# Patient Record
Sex: FEMALE | HISPANIC OR LATINO
[De-identification: names, ages, dates, MRNs, and addresses within clinical notes are randomized per-mention and may not be internally consistent; named-entity substitution may affect disease eponyms.]

---

## 2019-01-04 ENCOUNTER — APPOINTMENT (OUTPATIENT)
Dept: ORTHOPEDIC SURGERY | Facility: CLINIC | Age: 49
End: 2019-01-04
Payer: MEDICAID

## 2019-01-04 VITALS — RESPIRATION RATE: 16 BRPM | BODY MASS INDEX: 21.69 KG/M2 | WEIGHT: 135 LBS | HEIGHT: 66 IN

## 2019-01-04 DIAGNOSIS — Z82.61 FAMILY HISTORY OF ARTHRITIS: ICD-10-CM

## 2019-01-04 DIAGNOSIS — Z78.9 OTHER SPECIFIED HEALTH STATUS: ICD-10-CM

## 2019-01-04 PROCEDURE — 77071 MNL APPL STRS JT RADIOGRAPHY: CPT

## 2019-01-04 PROCEDURE — 99204 OFFICE O/P NEW MOD 45 MIN: CPT

## 2019-01-05 ENCOUNTER — TRANSCRIPTION ENCOUNTER (OUTPATIENT)
Age: 49
End: 2019-01-05

## 2019-01-08 ENCOUNTER — FORM ENCOUNTER (OUTPATIENT)
Age: 49
End: 2019-01-08

## 2019-01-09 ENCOUNTER — APPOINTMENT (OUTPATIENT)
Dept: RADIOLOGY | Facility: CLINIC | Age: 49
End: 2019-01-09

## 2019-01-09 ENCOUNTER — OUTPATIENT (OUTPATIENT)
Dept: OUTPATIENT SERVICES | Facility: HOSPITAL | Age: 49
LOS: 1 days | End: 2019-01-09
Payer: COMMERCIAL

## 2019-01-09 PROCEDURE — 73610 X-RAY EXAM OF ANKLE: CPT | Mod: 26,50

## 2019-01-09 PROCEDURE — 73610 X-RAY EXAM OF ANKLE: CPT

## 2019-01-15 ENCOUNTER — APPOINTMENT (OUTPATIENT)
Age: 49
End: 2019-01-15
Payer: MEDICAID

## 2019-01-15 PROCEDURE — 27792 TREATMENT OF ANKLE FRACTURE: CPT | Mod: LT

## 2019-01-15 PROCEDURE — 29898 ANKLE ARTHROSCOPY/SURGERY: CPT | Mod: LT

## 2019-01-15 PROCEDURE — 76000 FLUOROSCOPY <1 HR PHYS/QHP: CPT | Mod: 26

## 2019-01-15 PROCEDURE — 29891 ARTHR ANK OSTCHN DF TAL&/TIB: CPT | Mod: LT

## 2019-01-15 PROCEDURE — 27829 TREAT LOWER LEG JOINT: CPT | Mod: LT

## 2019-01-15 PROCEDURE — 77071 MNL APPL STRS JT RADIOGRAPHY: CPT | Mod: LT

## 2019-01-27 ENCOUNTER — FORM ENCOUNTER (OUTPATIENT)
Age: 49
End: 2019-01-27

## 2019-01-28 ENCOUNTER — APPOINTMENT (OUTPATIENT)
Dept: RADIOLOGY | Facility: CLINIC | Age: 49
End: 2019-01-28

## 2019-01-28 ENCOUNTER — APPOINTMENT (OUTPATIENT)
Dept: ORTHOPEDIC SURGERY | Facility: CLINIC | Age: 49
End: 2019-01-28
Payer: MEDICAID

## 2019-01-28 ENCOUNTER — OUTPATIENT (OUTPATIENT)
Dept: OUTPATIENT SERVICES | Facility: HOSPITAL | Age: 49
LOS: 1 days | End: 2019-01-28
Payer: MEDICAID

## 2019-01-28 PROCEDURE — 99024 POSTOP FOLLOW-UP VISIT: CPT

## 2019-01-28 PROCEDURE — 73610 X-RAY EXAM OF ANKLE: CPT

## 2019-01-28 PROCEDURE — 73610 X-RAY EXAM OF ANKLE: CPT | Mod: 26,LT

## 2019-02-24 ENCOUNTER — FORM ENCOUNTER (OUTPATIENT)
Age: 49
End: 2019-02-24

## 2019-02-25 ENCOUNTER — APPOINTMENT (OUTPATIENT)
Dept: RADIOLOGY | Facility: CLINIC | Age: 49
End: 2019-02-25

## 2019-02-25 ENCOUNTER — APPOINTMENT (OUTPATIENT)
Dept: ORTHOPEDIC SURGERY | Facility: CLINIC | Age: 49
End: 2019-02-25
Payer: MEDICAID

## 2019-02-25 ENCOUNTER — OUTPATIENT (OUTPATIENT)
Dept: OUTPATIENT SERVICES | Facility: HOSPITAL | Age: 49
LOS: 1 days | End: 2019-02-25
Payer: COMMERCIAL

## 2019-02-25 VITALS — WEIGHT: 135 LBS | BODY MASS INDEX: 21.69 KG/M2 | HEIGHT: 66 IN | RESPIRATION RATE: 16 BRPM

## 2019-02-25 PROCEDURE — 99024 POSTOP FOLLOW-UP VISIT: CPT

## 2019-02-25 PROCEDURE — 73610 X-RAY EXAM OF ANKLE: CPT | Mod: 26,LT

## 2019-02-25 PROCEDURE — 73610 X-RAY EXAM OF ANKLE: CPT

## 2019-02-25 RX ORDER — OXYCODONE AND ACETAMINOPHEN 5; 325 MG/1; MG/1
5-325 TABLET ORAL
Qty: 60 | Refills: 0 | Status: DISCONTINUED | COMMUNITY
Start: 2019-01-14 | End: 2019-02-25

## 2019-02-25 RX ORDER — ASPIRIN 325 MG/1
325 TABLET, FILM COATED ORAL TWICE DAILY
Qty: 60 | Refills: 0 | Status: DISCONTINUED | COMMUNITY
Start: 2019-01-14 | End: 2019-02-25

## 2019-02-25 RX ORDER — ONDANSETRON 4 MG/1
4 TABLET ORAL
Qty: 10 | Refills: 0 | Status: DISCONTINUED | COMMUNITY
Start: 2019-01-14 | End: 2019-02-25

## 2019-02-25 RX ORDER — DOCUSATE SODIUM 100 MG/1
100 CAPSULE ORAL TWICE DAILY
Qty: 28 | Refills: 0 | Status: DISCONTINUED | COMMUNITY
Start: 2019-01-14 | End: 2019-02-25

## 2019-04-10 ENCOUNTER — EMERGENCY (EMERGENCY)
Facility: HOSPITAL | Age: 49
LOS: 1 days | Discharge: HOME | End: 2019-04-10
Attending: EMERGENCY MEDICINE | Admitting: EMERGENCY MEDICINE
Payer: MEDICAID

## 2019-04-10 VITALS
HEART RATE: 81 BPM | RESPIRATION RATE: 17 BRPM | SYSTOLIC BLOOD PRESSURE: 121 MMHG | DIASTOLIC BLOOD PRESSURE: 76 MMHG | WEIGHT: 139.99 LBS | TEMPERATURE: 98 F | OXYGEN SATURATION: 96 %

## 2019-04-10 DIAGNOSIS — S82.892A OTHER FRACTURE OF LEFT LOWER LEG, INITIAL ENCOUNTER FOR CLOSED FRACTURE: Chronic | ICD-10-CM

## 2019-04-10 PROCEDURE — 73610 X-RAY EXAM OF ANKLE: CPT | Mod: 26,LT

## 2019-04-10 PROCEDURE — 99283 EMERGENCY DEPT VISIT LOW MDM: CPT | Mod: 25

## 2019-04-10 NOTE — ED ADULT NURSE NOTE - NSIMPLEMENTINTERV_GEN_ALL_ED
Implemented All Universal Safety Interventions:  [place] to call system. Call bell, personal items and telephone within reach. Instruct patient to call for assistance. Room bathroom lighting operational. Non-slip footwear when patient is off stretcher. Physically safe environment: no spills, clutter or unnecessary equipment. Stretcher in lowest position, wheels locked, appropriate side rails in place.

## 2019-04-10 NOTE — ED PROVIDER NOTE - DIAGNOSTIC INTERPRETATION
Interpreted by ED Physician:  Ankle xray (3 view)- no fx, no jt effusion, no soft tissue swelling, hardware in place

## 2019-04-10 NOTE — ED PROVIDER NOTE - NSFOLLOWUPINSTRUCTIONS_ED_ALL_ED_FT
Apply bacitracin to the wound 2-3 times daily until scab is well formed.  Follow up with Dr. Gaspar as needed.  Return to the ER for increased pain, signs of infection.

## 2019-04-10 NOTE — ED PROVIDER NOTE - OBJECTIVE STATEMENT
48 y.o F with PSHx for left ankle Fx presents to ED with injury above left ankle. She sustained her injury after being struck by a rolling platform full of folding chairs at work. She recently had ankle surgery for bimalar Fx by Dr Alfonso. She healed well, but the accident from earlier today prompted her to arrive to ED for evaluation. She sustained the injury above the L ankle along the medial side and has an abrasion as a result. Pt is up to date with tetanus, and did not take any medications PTA. She reports that there is no pain on surgical site. No numbness, no tingling, and no weakness 48 y.o F with PSHx for left ankle Fx presents to ED with injury above left ankle. She sustained her injury after being struck by a rolling platform full of folding chairs at work. She recently had ankle surgery for brittany Fx by Dr Alfonso. She healed well, but the accident from earlier today prompted her to arrive to ED for evaluation. She sustained the injury above the L ankle along the medial side and has an abrasion as a result. Pt is up to date with tetanus, and did not take any medications PTA. She reports that there is no pain on surgical site. No numbness, no tingling, and no weakness

## 2019-04-10 NOTE — ED PROVIDER NOTE - CLINICAL SUMMARY MEDICAL DECISION MAKING FREE TEXT BOX
48 y.o F with past SHx for left ankle fx presents to ED with injury sustained above L ankle at work. Plan is to apply Bacitracin to the wound and do an XR given recent surgery. Anticipate discharge.

## 2019-04-10 NOTE — ED PROVIDER NOTE - NSCAREINITIATED _GEN_ER
Informed caregiver Kim that patient due for depo and office visit. Nurse visit scheduled 4/26/18 for depo injection and office visit for yearly physical scheduled 6/8/18. Confirmed with Kim today.  
Kim 
She is overdue for her depo. She is also due for yearly OV with me in [place]  
Yuko Zarco(Attending)
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
## 2019-04-14 DIAGNOSIS — M25.572 PAIN IN LEFT ANKLE AND JOINTS OF LEFT FOOT: ICD-10-CM
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
## 2019-04-14 DIAGNOSIS — S80.12XA CONTUSION OF LEFT LOWER LEG, INITIAL ENCOUNTER: ICD-10-CM

## 2019-05-01 ENCOUNTER — APPOINTMENT (OUTPATIENT)
Dept: ORTHOPEDIC SURGERY | Facility: CLINIC | Age: 49
End: 2019-05-01

## 2019-12-19 ENCOUNTER — TRANSCRIPTION ENCOUNTER (OUTPATIENT)
Age: 49
End: 2019-12-19

## 2020-01-08 NOTE — ED ADULT NURSE NOTE - ED COMFORT CARE
1. Have you been to the ER, urgent care clinic since your last visit? NO  Hospitalized since your last visit? No    2. Have you seen or consulted any other health care providers outside of the [address] [place] [address] since your last visit? Include any pap smears or colon screening. No    Wt Readings from Last 3 Encounters:   01/08/20 218 lb (98.9 kg)   06/05/19 220 lb 1.6 oz (99.8 kg)   02/05/19 224 lb 4.8 oz (101.7 kg)     Temp Readings from Last 3 Encounters:   01/08/20 96.7 °F (35.9 °C) (Oral)   06/05/19 97.4 °F (36.3 °C) (Oral)   02/05/19 96.9 °F (36.1 °C) (Oral)     BP Readings from Last 3 Encounters:   01/08/20 134/74   06/05/19 163/80   02/05/19 162/87     Pulse Readings from Last 3 Encounters:   01/08/20 84   06/05/19 82   02/05/19 79     Lab Results   Component Value Date/Time    Hemoglobin A1c 7.6 (H) 12/06/2019 10:05 AM    Hemoglobin A1c (POC) 7.8 04/21/2014 01:34 PM     Patient has meter today. Patient informed/Explanation of wait

## 2020-01-19 ENCOUNTER — EMERGENCY (EMERGENCY)
Facility: HOSPITAL | Age: 50
LOS: 1 days | Discharge: HOME | End: 2020-01-19
Attending: EMERGENCY MEDICINE | Admitting: EMERGENCY MEDICINE
Payer: MEDICAID

## 2020-01-19 VITALS
HEIGHT: 66 IN | SYSTOLIC BLOOD PRESSURE: 137 MMHG | TEMPERATURE: 99 F | WEIGHT: 136.69 LBS | RESPIRATION RATE: 17 BRPM | HEART RATE: 110 BPM | DIASTOLIC BLOOD PRESSURE: 88 MMHG | OXYGEN SATURATION: 98 %

## 2020-01-19 VITALS
SYSTOLIC BLOOD PRESSURE: 125 MMHG | DIASTOLIC BLOOD PRESSURE: 80 MMHG | TEMPERATURE: 98 F | HEART RATE: 90 BPM | RESPIRATION RATE: 16 BRPM | OXYGEN SATURATION: 96 %

## 2020-01-19 DIAGNOSIS — S82.892A OTHER FRACTURE OF LEFT LOWER LEG, INITIAL ENCOUNTER FOR CLOSED FRACTURE: Chronic | ICD-10-CM

## 2020-01-19 LAB
ALBUMIN SERPL ELPH-MCNC: 3.9 G/DL — SIGNIFICANT CHANGE UP (ref 3.4–5)
ALP SERPL-CCNC: 90 U/L — SIGNIFICANT CHANGE UP (ref 40–120)
ALT FLD-CCNC: 22 U/L — SIGNIFICANT CHANGE UP (ref 12–42)
ANION GAP SERPL CALC-SCNC: 10 MMOL/L — SIGNIFICANT CHANGE UP (ref 9–16)
APPEARANCE UR: CLEAR — SIGNIFICANT CHANGE UP
APTT BLD: 35.6 SEC — SIGNIFICANT CHANGE UP (ref 27.5–36.3)
AST SERPL-CCNC: 14 U/L — LOW (ref 15–37)
BASOPHILS # BLD AUTO: 0.02 K/UL — SIGNIFICANT CHANGE UP (ref 0–0.2)
BASOPHILS NFR BLD AUTO: 0.3 % — SIGNIFICANT CHANGE UP (ref 0–2)
BILIRUB SERPL-MCNC: 0.1 MG/DL — LOW (ref 0.2–1.2)
BILIRUB UR-MCNC: NEGATIVE — SIGNIFICANT CHANGE UP
BUN SERPL-MCNC: 13 MG/DL — SIGNIFICANT CHANGE UP (ref 7–23)
CALCIUM SERPL-MCNC: 9 MG/DL — SIGNIFICANT CHANGE UP (ref 8.5–10.5)
CHLORIDE SERPL-SCNC: 102 MMOL/L — SIGNIFICANT CHANGE UP (ref 96–108)
CO2 SERPL-SCNC: 29 MMOL/L — SIGNIFICANT CHANGE UP (ref 22–31)
COLOR SPEC: SIGNIFICANT CHANGE UP
CREAT SERPL-MCNC: 0.75 MG/DL — SIGNIFICANT CHANGE UP (ref 0.5–1.3)
DIFF PNL FLD: ABNORMAL
EOSINOPHIL # BLD AUTO: 0.12 K/UL — SIGNIFICANT CHANGE UP (ref 0–0.5)
EOSINOPHIL NFR BLD AUTO: 1.8 % — SIGNIFICANT CHANGE UP (ref 0–6)
FLU A RESULT: DETECTED
FLU A RESULT: DETECTED
FLUAV AG NPH QL: DETECTED
FLUBV AG NPH QL: SIGNIFICANT CHANGE UP
GLUCOSE SERPL-MCNC: 104 MG/DL — HIGH (ref 70–99)
GLUCOSE UR QL: NEGATIVE — SIGNIFICANT CHANGE UP
HCG UR QL: NEGATIVE — SIGNIFICANT CHANGE UP
HCT VFR BLD CALC: 38.8 % — SIGNIFICANT CHANGE UP (ref 34.5–45)
HGB BLD-MCNC: 12.8 G/DL — SIGNIFICANT CHANGE UP (ref 11.5–15.5)
IMM GRANULOCYTES NFR BLD AUTO: 0.3 % — SIGNIFICANT CHANGE UP (ref 0–1.5)
INR BLD: 1.04 — SIGNIFICANT CHANGE UP (ref 0.88–1.16)
KETONES UR-MCNC: NEGATIVE — SIGNIFICANT CHANGE UP
LEUKOCYTE ESTERASE UR-ACNC: ABNORMAL
LYMPHOCYTES # BLD AUTO: 0.94 K/UL — LOW (ref 1–3.3)
LYMPHOCYTES # BLD AUTO: 14.2 % — SIGNIFICANT CHANGE UP (ref 13–44)
MCHC RBC-ENTMCNC: 29.2 PG — SIGNIFICANT CHANGE UP (ref 27–34)
MCHC RBC-ENTMCNC: 33 GM/DL — SIGNIFICANT CHANGE UP (ref 32–36)
MCV RBC AUTO: 88.6 FL — SIGNIFICANT CHANGE UP (ref 80–100)
MONOCYTES # BLD AUTO: 0.39 K/UL — SIGNIFICANT CHANGE UP (ref 0–0.9)
MONOCYTES NFR BLD AUTO: 5.9 % — SIGNIFICANT CHANGE UP (ref 2–14)
NEUTROPHILS # BLD AUTO: 5.14 K/UL — SIGNIFICANT CHANGE UP (ref 1.8–7.4)
NEUTROPHILS NFR BLD AUTO: 77.5 % — HIGH (ref 43–77)
NITRITE UR-MCNC: NEGATIVE — SIGNIFICANT CHANGE UP
NRBC # BLD: 0 /100 WBCS — SIGNIFICANT CHANGE UP (ref 0–0)
PH UR: 7 — SIGNIFICANT CHANGE UP (ref 5–8)
PLATELET # BLD AUTO: 289 K/UL — SIGNIFICANT CHANGE UP (ref 150–400)
POTASSIUM SERPL-MCNC: 3.8 MMOL/L — SIGNIFICANT CHANGE UP (ref 3.5–5.3)
POTASSIUM SERPL-SCNC: 3.8 MMOL/L — SIGNIFICANT CHANGE UP (ref 3.5–5.3)
PROT SERPL-MCNC: 7.6 G/DL — SIGNIFICANT CHANGE UP (ref 6.4–8.2)
PROT UR-MCNC: NEGATIVE MG/DL — SIGNIFICANT CHANGE UP
PROTHROM AB SERPL-ACNC: 11.5 SEC — SIGNIFICANT CHANGE UP (ref 10–12.9)
RBC # BLD: 4.38 M/UL — SIGNIFICANT CHANGE UP (ref 3.8–5.2)
RBC # FLD: 14.1 % — SIGNIFICANT CHANGE UP (ref 10.3–14.5)
RSV RESULT: SIGNIFICANT CHANGE UP
RSV RNA RESP QL NAA+PROBE: SIGNIFICANT CHANGE UP
SODIUM SERPL-SCNC: 141 MMOL/L — SIGNIFICANT CHANGE UP (ref 132–145)
SP GR SPEC: 1.01 — SIGNIFICANT CHANGE UP (ref 1–1.03)
UROBILINOGEN FLD QL: 0.2 E.U./DL — SIGNIFICANT CHANGE UP
WBC # BLD: 6.63 K/UL — SIGNIFICANT CHANGE UP (ref 3.8–10.5)
WBC # FLD AUTO: 6.63 K/UL — SIGNIFICANT CHANGE UP (ref 3.8–10.5)

## 2020-01-19 PROCEDURE — 71046 X-RAY EXAM CHEST 2 VIEWS: CPT | Mod: 26

## 2020-01-19 PROCEDURE — 93010 ELECTROCARDIOGRAM REPORT: CPT

## 2020-01-19 PROCEDURE — 99285 EMERGENCY DEPT VISIT HI MDM: CPT

## 2020-01-19 RX ORDER — ACETAMINOPHEN 500 MG
975 TABLET ORAL ONCE
Refills: 0 | Status: COMPLETED | OUTPATIENT
Start: 2020-01-19 | End: 2020-01-19

## 2020-01-19 RX ORDER — IBUPROFEN 200 MG
1 TABLET ORAL
Qty: 30 | Refills: 0
Start: 2020-01-19

## 2020-01-19 RX ORDER — CEFTRIAXONE 500 MG/1
1000 INJECTION, POWDER, FOR SOLUTION INTRAMUSCULAR; INTRAVENOUS ONCE
Refills: 0 | Status: COMPLETED | OUTPATIENT
Start: 2020-01-19 | End: 2020-01-19

## 2020-01-19 RX ORDER — ALBUTEROL 90 UG/1
2 AEROSOL, METERED ORAL
Qty: 1 | Refills: 0
Start: 2020-01-19 | End: 2020-02-17

## 2020-01-19 RX ORDER — SODIUM CHLORIDE 9 MG/ML
1900 INJECTION INTRAMUSCULAR; INTRAVENOUS; SUBCUTANEOUS ONCE
Refills: 0 | Status: COMPLETED | OUTPATIENT
Start: 2020-01-19 | End: 2020-01-19

## 2020-01-19 RX ORDER — IPRATROPIUM/ALBUTEROL SULFATE 18-103MCG
3 AEROSOL WITH ADAPTER (GRAM) INHALATION ONCE
Refills: 0 | Status: COMPLETED | OUTPATIENT
Start: 2020-01-19 | End: 2020-01-19

## 2020-01-19 RX ADMIN — Medication 75 MILLIGRAM(S): at 19:31

## 2020-01-19 RX ADMIN — Medication 3 MILLILITER(S): at 18:56

## 2020-01-19 RX ADMIN — Medication 975 MILLIGRAM(S): at 19:44

## 2020-01-19 RX ADMIN — Medication 200 MILLIGRAM(S): at 18:56

## 2020-01-19 RX ADMIN — CEFTRIAXONE 100 MILLIGRAM(S): 500 INJECTION, POWDER, FOR SOLUTION INTRAMUSCULAR; INTRAVENOUS at 18:57

## 2020-01-19 RX ADMIN — SODIUM CHLORIDE 1900 MILLILITER(S): 9 INJECTION INTRAMUSCULAR; INTRAVENOUS; SUBCUTANEOUS at 18:57

## 2020-01-19 RX ADMIN — Medication 975 MILLIGRAM(S): at 18:56

## 2020-01-19 NOTE — ED ADULT NURSE NOTE - NS ED NURSE IV DC DT
normal... Well appearing, well nourished, awake, alert, oriented to person, place, time/situation and in no apparent distress. 19-Jan-2020 21:42

## 2020-01-19 NOTE — ED PROVIDER NOTE - NSFOLLOWUPINSTRUCTIONS_ED_ALL_ED_FT
You have Influenza A.    Please rest and stay hydrated.    Home quarantine until 24h after fever breaks and you start to feel better.     Return to the ER for worsening symptoms or trouble breathing.

## 2020-01-19 NOTE — ED PROVIDER NOTE - OBJECTIVE STATEMENT
50 y/o F with no PMHx presents to the ED for coughs, fever and flu like symptoms x2 days. Pt reports her co-worker was recently sick with "the flu". She states she had similar symptoms in the past when she had "a walking pneumonia". Pt received the flu shot this year. She tried taking OTC Motrin and Tylenol for her symptoms. She denies SOB, CP, and any other complaints.

## 2020-01-19 NOTE — ED PROVIDER NOTE - CONSTITUTIONAL, MLM
normal... Ill appearing. Awake, alert, oriented to person, place, time/situation and in no apparent distress.

## 2020-01-19 NOTE — ED PROVIDER NOTE - PATIENT PORTAL LINK FT
You can access the FollowMyHealth Patient Portal offered by [facility] by registering at the following website: [email or web address]. By joining [employer]’s FollowMyHealth portal, you will also be able to view your health information using other applications (apps) compatible with our system.

## 2020-01-19 NOTE — ED PROVIDER NOTE - CLINICAL SUMMARY MEDICAL DECISION MAKING FREE TEXT BOX
50 y/o F presenting with flu like symptoms. Sepsis work up was initiated on arrival. CXR is unremarkable. Flu test is positive. Will not pursue sepsis further as a diagnosis. Anticipate D/C after IV hydration. Of note, Pt's children are currently staying with her  and endorses she will not be contagious to them.

## 2020-01-19 NOTE — ED ADULT NURSE NOTE - NSIMPLEMENTINTERV_GEN_ALL_ED
Implemented All Universal Safety Interventions:  [place] to call system. Call bell, personal items and telephone within reach. Instruct patient to call for assistance. Room bathroom lighting operational. Non-slip footwear when patient is off stretcher. Physically safe environment: no spills, clutter or unnecessary equipment. Stretcher in lowest position, wheels locked, appropriate side rails in place.

## 2020-01-19 NOTE — ED PROVIDER NOTE - PROGRESS NOTE DETAILS
Labs wnl, aside from Flu A +ve,  feeling somewhat better, less cough, will d/c with Tamiflu and cough meds.

## 2020-01-20 PROBLEM — Z78.9 OTHER SPECIFIED HEALTH STATUS: Chronic | Status: ACTIVE | Noted: 2019-04-10

## 2020-01-20 LAB
CULTURE RESULTS: SIGNIFICANT CHANGE UP
SPECIMEN SOURCE: SIGNIFICANT CHANGE UP

## 2020-01-24 LAB
CULTURE RESULTS: SIGNIFICANT CHANGE UP
CULTURE RESULTS: SIGNIFICANT CHANGE UP
SPECIMEN SOURCE: SIGNIFICANT CHANGE UP
SPECIMEN SOURCE: SIGNIFICANT CHANGE UP

## 2020-01-25 DIAGNOSIS — R50.9 FEVER, UNSPECIFIED: ICD-10-CM

## 2020-01-25 DIAGNOSIS — J10.1 INFLUENZA DUE TO OTHER IDENTIFIED INFLUENZA VIRUS WITH OTHER RESPIRATORY MANIFESTATIONS: ICD-10-CM

## 2021-07-12 ENCOUNTER — EMERGENCY (EMERGENCY)
Facility: HOSPITAL | Age: 51
LOS: 1 days | Discharge: HOME | End: 2021-07-12
Attending: EMERGENCY MEDICINE | Admitting: EMERGENCY MEDICINE
Payer: MEDICAID

## 2021-07-12 VITALS
SYSTOLIC BLOOD PRESSURE: 124 MMHG | TEMPERATURE: 98 F | RESPIRATION RATE: 18 BRPM | OXYGEN SATURATION: 97 % | HEART RATE: 65 BPM | DIASTOLIC BLOOD PRESSURE: 86 MMHG

## 2021-07-12 VITALS
OXYGEN SATURATION: 97 % | WEIGHT: 139.99 LBS | DIASTOLIC BLOOD PRESSURE: 68 MMHG | RESPIRATION RATE: 18 BRPM | SYSTOLIC BLOOD PRESSURE: 108 MMHG | TEMPERATURE: 98 F | HEART RATE: 84 BPM | HEIGHT: 66 IN

## 2021-07-12 DIAGNOSIS — M25.571 PAIN IN RIGHT ANKLE AND JOINTS OF RIGHT FOOT: ICD-10-CM

## 2021-07-12 DIAGNOSIS — M19.90 UNSPECIFIED OSTEOARTHRITIS, UNSPECIFIED SITE: ICD-10-CM

## 2021-07-12 DIAGNOSIS — M25.471 EFFUSION, RIGHT ANKLE: ICD-10-CM

## 2021-07-12 DIAGNOSIS — S82.892A OTHER FRACTURE OF LEFT LOWER LEG, INITIAL ENCOUNTER FOR CLOSED FRACTURE: Chronic | ICD-10-CM

## 2021-07-12 LAB
ALBUMIN SERPL ELPH-MCNC: 3.5 G/DL — SIGNIFICANT CHANGE UP (ref 3.4–5)
ALP SERPL-CCNC: 87 U/L — SIGNIFICANT CHANGE UP (ref 40–120)
ALT FLD-CCNC: 20 U/L — SIGNIFICANT CHANGE UP (ref 12–42)
ANION GAP SERPL CALC-SCNC: 7 MMOL/L — LOW (ref 9–16)
AST SERPL-CCNC: 14 U/L — LOW (ref 15–37)
BASOPHILS # BLD AUTO: 0.06 K/UL — SIGNIFICANT CHANGE UP (ref 0–0.2)
BASOPHILS NFR BLD AUTO: 0.7 % — SIGNIFICANT CHANGE UP (ref 0–2)
BILIRUB SERPL-MCNC: 0.3 MG/DL — SIGNIFICANT CHANGE UP (ref 0.2–1.2)
BUN SERPL-MCNC: 13 MG/DL — SIGNIFICANT CHANGE UP (ref 7–23)
CALCIUM SERPL-MCNC: 9.1 MG/DL — SIGNIFICANT CHANGE UP (ref 8.5–10.5)
CHLORIDE SERPL-SCNC: 106 MMOL/L — SIGNIFICANT CHANGE UP (ref 96–108)
CO2 SERPL-SCNC: 27 MMOL/L — SIGNIFICANT CHANGE UP (ref 22–31)
CREAT SERPL-MCNC: 0.73 MG/DL — SIGNIFICANT CHANGE UP (ref 0.5–1.3)
CRP SERPL-MCNC: 13 MG/L — HIGH (ref 0–9)
EOSINOPHIL # BLD AUTO: 0.25 K/UL — SIGNIFICANT CHANGE UP (ref 0–0.5)
EOSINOPHIL NFR BLD AUTO: 2.8 % — SIGNIFICANT CHANGE UP (ref 0–6)
ERYTHROCYTE [SEDIMENTATION RATE] IN BLOOD: 27 MM/HR — HIGH (ref 0–20)
GLUCOSE SERPL-MCNC: 109 MG/DL — HIGH (ref 70–99)
HCT VFR BLD CALC: 38 % — SIGNIFICANT CHANGE UP (ref 34.5–45)
HGB BLD-MCNC: 12.5 G/DL — SIGNIFICANT CHANGE UP (ref 11.5–15.5)
IMM GRANULOCYTES NFR BLD AUTO: 0.5 % — SIGNIFICANT CHANGE UP (ref 0–1.5)
LYMPHOCYTES # BLD AUTO: 1.4 K/UL — SIGNIFICANT CHANGE UP (ref 1–3.3)
LYMPHOCYTES # BLD AUTO: 15.9 % — SIGNIFICANT CHANGE UP (ref 13–44)
MCHC RBC-ENTMCNC: 29.5 PG — SIGNIFICANT CHANGE UP (ref 27–34)
MCHC RBC-ENTMCNC: 32.9 GM/DL — SIGNIFICANT CHANGE UP (ref 32–36)
MCV RBC AUTO: 89.6 FL — SIGNIFICANT CHANGE UP (ref 80–100)
MONOCYTES # BLD AUTO: 0.57 K/UL — SIGNIFICANT CHANGE UP (ref 0–0.9)
MONOCYTES NFR BLD AUTO: 6.5 % — SIGNIFICANT CHANGE UP (ref 2–14)
NEUTROPHILS # BLD AUTO: 6.47 K/UL — SIGNIFICANT CHANGE UP (ref 1.8–7.4)
NEUTROPHILS NFR BLD AUTO: 73.6 % — SIGNIFICANT CHANGE UP (ref 43–77)
NRBC # BLD: 0 /100 WBCS — SIGNIFICANT CHANGE UP (ref 0–0)
PLATELET # BLD AUTO: 313 K/UL — SIGNIFICANT CHANGE UP (ref 150–400)
POTASSIUM SERPL-MCNC: 3.9 MMOL/L — SIGNIFICANT CHANGE UP (ref 3.5–5.3)
POTASSIUM SERPL-SCNC: 3.9 MMOL/L — SIGNIFICANT CHANGE UP (ref 3.5–5.3)
PROT SERPL-MCNC: 7.2 G/DL — SIGNIFICANT CHANGE UP (ref 6.4–8.2)
RBC # BLD: 4.24 M/UL — SIGNIFICANT CHANGE UP (ref 3.8–5.2)
RBC # FLD: 13.7 % — SIGNIFICANT CHANGE UP (ref 10.3–14.5)
SODIUM SERPL-SCNC: 140 MMOL/L — SIGNIFICANT CHANGE UP (ref 132–145)
URATE SERPL-MCNC: 3.5 MG/DL — SIGNIFICANT CHANGE UP (ref 2.5–7)
WBC # BLD: 8.79 K/UL — SIGNIFICANT CHANGE UP (ref 3.8–10.5)
WBC # FLD AUTO: 8.79 K/UL — SIGNIFICANT CHANGE UP (ref 3.8–10.5)

## 2021-07-12 PROCEDURE — 99284 EMERGENCY DEPT VISIT MOD MDM: CPT

## 2021-07-12 PROCEDURE — 73610 X-RAY EXAM OF ANKLE: CPT | Mod: 26,RT

## 2021-07-12 RX ORDER — IBUPROFEN 200 MG
1 TABLET ORAL
Qty: 30 | Refills: 0
Start: 2021-07-12 | End: 2021-07-21

## 2021-07-12 RX ORDER — IBUPROFEN 200 MG
600 TABLET ORAL ONCE
Refills: 0 | Status: COMPLETED | OUTPATIENT
Start: 2021-07-12 | End: 2021-07-12

## 2021-07-12 RX ADMIN — Medication 600 MILLIGRAM(S): at 09:25

## 2021-07-12 NOTE — ED PROVIDER NOTE - CLINICAL SUMMARY MEDICAL DECISION MAKING FREE TEXT BOX
Pt presents to the ED w/ R ankle pain, redness, swelling x 1 week. No fever, chills, break in skin or Hx of gout. On exam, Pt with warmth, erythema and swelling to R ankle, full ROM to ankle; Pt is able to bear weight with minimal discomfort. Will check labs, R ankle xrays, and give NSAIDs. Will reassess.

## 2021-07-12 NOTE — ED PROVIDER NOTE - PROGRESS NOTE DETAILS
DW Dr Kan's office and they will see pt this Wednesday at 9:30AM. Pt is afebrile, with family history of arthritis, likely gout or RA, normal WBC, ESR and uric acid pending. Precautions reviewed.

## 2021-07-12 NOTE — ED PROVIDER NOTE - MUSCULOSKELETAL, MLM
RLE: +swelling to R medial ankle with warmth and erythema. Pt is able to ambulate without difficulty.

## 2021-07-12 NOTE — ED PROVIDER NOTE - PATIENT PORTAL LINK FT
You can access the FollowMyHealth Patient Portal offered by [facility] by registering at the following website: [email or web address]. By joining [employer]’s FollowMyHealth portal, you will also be able to view your health information using other applications (apps) compatible with our system.

## 2021-07-12 NOTE — ED PROVIDER NOTE - CARE PROVIDER_API CALL
Lloyd Kan)  Orthopaedic Surgery  [address], [address] [place] [address]  Phone: [phone]  Fax: [phone]  Follow Up Time:

## 2021-07-12 NOTE — ED PROVIDER NOTE - OBJECTIVE STATEMENT
52 yo F presents to the ED c/o atraumatic R ankle pain, swelling and redness x 1 week. Pt reports Hx of L ankle closed fracture repair 1-2 years ago performed by Dr Kan, but denies   no fhx of gout mom RA dad dm 52 yo F presents to the ED c/o atraumatic R ankle pain, swelling and redness x 1 week. Pt reports Hx of L ankle closed fracture repair 1-2 years ago performed by Dr Kan, but denies Hx of R ankle injury. Pt denies fever, chills, injury or fall, break in skin or FHx of arthritis or gout. FHx: rheumatoid arthritis (mother) and DM (father). Pt states she has been tested in the past for RA with negative blood work.

## 2021-07-14 ENCOUNTER — APPOINTMENT (OUTPATIENT)
Dept: ORTHOPEDIC SURGERY | Facility: CLINIC | Age: 51
End: 2021-07-14
Payer: MEDICAID

## 2021-07-14 ENCOUNTER — RESULT REVIEW (OUTPATIENT)
Age: 51
End: 2021-07-14

## 2021-07-14 ENCOUNTER — OUTPATIENT (OUTPATIENT)
Dept: OUTPATIENT SERVICES | Facility: HOSPITAL | Age: 51
LOS: 1 days | End: 2021-07-14
Payer: COMMERCIAL

## 2021-07-14 DIAGNOSIS — S82.892A OTHER FRACTURE OF LEFT LOWER LEG, INITIAL ENCOUNTER FOR CLOSED FRACTURE: Chronic | ICD-10-CM

## 2021-07-14 PROCEDURE — 73610 X-RAY EXAM OF ANKLE: CPT | Mod: 26,RT

## 2021-07-14 PROCEDURE — 99072 ADDL SUPL MATRL&STAF TM PHE: CPT

## 2021-07-14 PROCEDURE — 99213 OFFICE O/P EST LOW 20 MIN: CPT

## 2021-07-14 PROCEDURE — 73610 X-RAY EXAM OF ANKLE: CPT

## 2021-07-14 RX ORDER — COCOA BUTTER, PHENYLEPHRINE HCL 2.135; .0065 G/2.5G; G/2.5G
SUPPOSITORY RECTAL
Qty: 1 | Refills: 0 | Status: ACTIVE | COMMUNITY
Start: 2021-07-14 | End: 1900-01-01

## 2021-07-15 LAB — RHEUMATOID FACT SER QL: <10 IU/ML

## 2021-07-19 ENCOUNTER — NON-APPOINTMENT (OUTPATIENT)
Age: 51
End: 2021-07-19

## 2021-07-19 LAB
ANA PAT FLD IF-IMP: ABNORMAL
ANA SER IF-ACNC: ABNORMAL

## 2021-07-22 ENCOUNTER — TRANSCRIPTION ENCOUNTER (OUTPATIENT)
Age: 51
End: 2021-07-22

## 2021-07-28 ENCOUNTER — TRANSCRIPTION ENCOUNTER (OUTPATIENT)
Age: 51
End: 2021-07-28

## 2021-08-04 ENCOUNTER — APPOINTMENT (OUTPATIENT)
Dept: ORTHOPEDIC SURGERY | Facility: CLINIC | Age: 51
End: 2021-08-04
Payer: MEDICAID

## 2021-08-04 ENCOUNTER — NON-APPOINTMENT (OUTPATIENT)
Age: 51
End: 2021-08-04

## 2021-08-04 ENCOUNTER — APPOINTMENT (OUTPATIENT)
Dept: RHEUMATOLOGY | Facility: CLINIC | Age: 51
End: 2021-08-04
Payer: MEDICAID

## 2021-08-04 ENCOUNTER — APPOINTMENT (OUTPATIENT)
Dept: MRI IMAGING | Facility: CLINIC | Age: 51
End: 2021-08-04

## 2021-08-04 ENCOUNTER — LABORATORY RESULT (OUTPATIENT)
Age: 51
End: 2021-08-04

## 2021-08-04 VITALS
SYSTOLIC BLOOD PRESSURE: 115 MMHG | TEMPERATURE: 97.8 F | WEIGHT: 142.6 LBS | BODY MASS INDEX: 22.92 KG/M2 | HEIGHT: 66 IN | OXYGEN SATURATION: 96 % | DIASTOLIC BLOOD PRESSURE: 77 MMHG | HEART RATE: 85 BPM

## 2021-08-04 PROCEDURE — 99214 OFFICE O/P EST MOD 30 MIN: CPT | Mod: 95

## 2021-08-04 PROCEDURE — 99204 OFFICE O/P NEW MOD 45 MIN: CPT | Mod: 25

## 2021-08-04 PROCEDURE — 36415 COLL VENOUS BLD VENIPUNCTURE: CPT

## 2021-08-04 NOTE — HISTORY OF PRESENT ILLNESS
[FreeTextEntry1] : 51 year old woman referred by orthopedist for rheumatology evaluation. \par \par Patient with history of right ankle swelling and pain for the past month\par No history of trauma or injury\par Worsens after standing at work\par Feels some improvement in terms of pain with ibuprofen 600 mg bid, no benefit in terms of swelling\par Now also with pain in the bilateral hips and knees\par Feels fullness in the posterior bilateral knees, left more than right \par Stiffness and pain present\par no involvement of the upper extremities\par \par No history of RA or psoriasis\par Mother with severe RA\par No preceding infection\par Two children 10 and 13, no recent infections\par \par Patient with history of covid infection in March 2021, since that time feels some memory issues\par Completed Pfizer vaccine in March 2021\par \par Works in [place], opened a [employer], stands on feet all day\par No known tick exposure, no history of tick bite\par No preceding GI Ilness\par No history of STI\par \par History of thyroid disease following pregnancy, reports history of Hashimoto's\par Given mother's history of RA, has had tests completed in past fo RA which ere negative, patient is unclear if had Positive LEXI in past, although has been diagnosed with Hashimoto's, currently taking levothyroxine.\par \par

## 2021-08-04 NOTE — REVIEW OF SYSTEMS
[Arthralgias] : arthralgias [Joint Swelling] : joint swelling [Joint Stiffness] : joint stiffness [Negative] : Heme/Lymph [Fever] : no fever [Chills] : no chills

## 2021-08-04 NOTE — PHYSICAL EXAM
[General Appearance - Alert] : alert [General Appearance - In No Acute Distress] : in no acute distress [General Appearance - Well-Appearing] : healthy appearing [Sclera] : the sclera and conjunctiva were normal [] : no respiratory distress [Respiration, Rhythm And Depth] : normal respiratory rhythm and effort [Exaggerated Use Of Accessory Muscles For Inspiration] : no accessory muscle use [No Spinal Tenderness] : no spinal tenderness [Oriented To Time, Place, And Person] : oriented to person, place, and time [Impaired Insight] : insight and judgment were intact [Affect] : the affect was normal [FreeTextEntry1] : subcutaneous nodules of the upper lower extremity bilaterally, nontender, no erythema or warmth

## 2021-08-04 NOTE — ASSESSMENT
[FreeTextEntry1] : 51 year old woman referred for rheumatology evaluation.  At this time, patient with right ankle edema with bilateral knee and hip pain for the past month.  Patient is concerned as her mother with severe RA. MRI of the right ankle suggestive of subcutaneous edema without joint effusion or inflammatory changes, will reach out to radiologist to further discuss. Will continue NSAIDs for now as reports some benefit although minimal at this time, will increase to tid with food. Will also check labs today, previous RF negative, will check anti CCP today in addition to ESR, CRP, Lyme, and HLA B27. Patient with positive LEXI which may be associated with underlying Hashimoto's, but will check LEXI with serologies as well.\par \par Addendum:\par Spoke with radiologist regarding MRI results:  no joint effusions noted, subcutaneous edema noted.

## 2021-08-05 ENCOUNTER — OUTPATIENT (OUTPATIENT)
Dept: OUTPATIENT SERVICES | Facility: HOSPITAL | Age: 51
LOS: 1 days | End: 2021-08-05
Payer: COMMERCIAL

## 2021-08-05 ENCOUNTER — APPOINTMENT (OUTPATIENT)
Dept: DERMATOLOGY | Facility: CLINIC | Age: 51
End: 2021-08-05
Payer: MEDICAID

## 2021-08-05 ENCOUNTER — NON-APPOINTMENT (OUTPATIENT)
Age: 51
End: 2021-08-05

## 2021-08-05 ENCOUNTER — RESULT REVIEW (OUTPATIENT)
Age: 51
End: 2021-08-05

## 2021-08-05 DIAGNOSIS — R22.9 LOCALIZED SWELLING, MASS AND LUMP, UNSPECIFIED: ICD-10-CM

## 2021-08-05 DIAGNOSIS — S82.892A OTHER FRACTURE OF LEFT LOWER LEG, INITIAL ENCOUNTER FOR CLOSED FRACTURE: Chronic | ICD-10-CM

## 2021-08-05 PROCEDURE — 71046 X-RAY EXAM CHEST 2 VIEWS: CPT | Mod: 26

## 2021-08-05 PROCEDURE — 11104 PUNCH BX SKIN SINGLE LESION: CPT

## 2021-08-05 PROCEDURE — 99204 OFFICE O/P NEW MOD 45 MIN: CPT | Mod: 25

## 2021-08-05 PROCEDURE — 71046 X-RAY EXAM CHEST 2 VIEWS: CPT

## 2021-08-05 NOTE — HISTORY OF PRESENT ILLNESS
[FreeTextEntry1] : ankle swelling, spots on legs  [de-identified] : 50 yo F new patient here for above \par 1 mo ago developed pain bottom of both heels and R ankle and swelling then 2 weeks ago pain in back of knees and hips - got MRI R ankle showing just swelling\par 2 weeks ago started to get mildly tender skin nodules on legs - fixed/do not move around\par more tired since pandemic started - got covid march 2020 and vaccine march 2021\par + joint pain but no mouth sores, gi upset or diarrhea, no fevers, no chills, no travel, no enlarged lymph nodes, no weight loss \par + dry cough, sob - since had covid\par no vision changes \par recently went to Rheum - labs pending including Lyme \par CRP, ESR, platelets elevated, others pending \par \par 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

## 2021-08-05 NOTE — PHYSICAL EXAM
[Alert] : alert [Oriented x 3] : ~L oriented x 3 [Well Nourished] : well nourished [Conjunctiva Non-injected] : conjunctiva non-injected [No Visual Lymphadenopathy] : no visual  lymphadenopathy [No Clubbing] : no clubbing [No Edema] : no edema [No Bromhidrosis] : no bromhidrosis [No Chromhidrosis] : no chromhidrosis [FreeTextEntry3] : firm subcutaneous nodules L medial knee, R thigh x 2 and L upper anterior thigh one beneath longstanding scar, otherwise no overlying skin changes \par \par diffuse swelling right ankle without significant erythema or tenderness \par

## 2021-08-05 NOTE — ASSESSMENT
[External notes review: [ enter provider(s) name(s) ] :____] : As part of my evaluation, I have reviewed prior clinical note(s) from provider(s) outside of my group practice. The name(s) are: [unfilled] [Review of test: [ enter lab tests ] :____] : I reviewed the following test results: [unfilled]

## 2021-08-08 VITALS — WEIGHT: 142 LBS | RESPIRATION RATE: 16 BRPM | BODY MASS INDEX: 22.82 KG/M2 | HEIGHT: 66 IN

## 2021-08-08 LAB — URATE SERPL-MCNC: 3 MG/DL

## 2021-08-09 ENCOUNTER — NON-APPOINTMENT (OUTPATIENT)
Age: 51
End: 2021-08-09

## 2021-08-09 LAB
A PHAGOCYTOPH IGG TITR SER IF: NORMAL TITER
ALBUMIN SERPL ELPH-MCNC: 4.4 G/DL
ALP BLD-CCNC: 115 U/L
ALT SERPL-CCNC: 8 U/L
ANACR T: NEGATIVE
ANION GAP SERPL CALC-SCNC: 10 MMOL/L
AST SERPL-CCNC: 12 U/L
B BURGDOR AB SER QL IA: NEGATIVE
B MICROTI IGG TITR SER: NORMAL TITER
BASOPHILS # BLD AUTO: 0.07 K/UL
BASOPHILS NFR BLD AUTO: 0.9 %
BILIRUB SERPL-MCNC: <0.2 MG/DL
BUN SERPL-MCNC: 13 MG/DL
CALCIUM SERPL-MCNC: 10.4 MG/DL
CCP AB SER IA-ACNC: <8 UNITS
CHLORIDE SERPL-SCNC: 102 MMOL/L
CK SERPL-CCNC: 43 U/L
CO2 SERPL-SCNC: 28 MMOL/L
CREAT SERPL-MCNC: 0.62 MG/DL
CRP SERPL-MCNC: 11 MG/L
E CHAFFEENSIS IGG TITR SER IF: NORMAL TITER
EOSINOPHIL # BLD AUTO: 0.32 K/UL
EOSINOPHIL NFR BLD AUTO: 4.1 %
ERYTHROCYTE [SEDIMENTATION RATE] IN BLOOD BY WESTERGREN METHOD: 40 MM/HR
GLUCOSE SERPL-MCNC: 83 MG/DL
HCT VFR BLD CALC: 40.3 %
HGB BLD-MCNC: 12.8 G/DL
IMM GRANULOCYTES NFR BLD AUTO: 0.5 %
LYMPHOCYTES # BLD AUTO: 1.5 K/UL
LYMPHOCYTES NFR BLD AUTO: 19 %
MAN DIFF?: NORMAL
MCHC RBC-ENTMCNC: 29.4 PG
MCHC RBC-ENTMCNC: 31.8 GM/DL
MCV RBC AUTO: 92.6 FL
MONOCYTES # BLD AUTO: 0.42 K/UL
MONOCYTES NFR BLD AUTO: 5.3 %
NEUTROPHILS # BLD AUTO: 5.54 K/UL
NEUTROPHILS NFR BLD AUTO: 70.2 %
PLATELET # BLD AUTO: 424 K/UL
POTASSIUM SERPL-SCNC: 4.5 MMOL/L
PROT SERPL-MCNC: 6.9 G/DL
RBC # BLD: 4.35 M/UL
RBC # FLD: 13.7 %
RF+CCP IGG SER-IMP: NEGATIVE
SODIUM SERPL-SCNC: 140 MMOL/L
WBC # FLD AUTO: 7.89 K/UL

## 2021-08-10 ENCOUNTER — NON-APPOINTMENT (OUTPATIENT)
Age: 51
End: 2021-08-10

## 2021-08-10 LAB — HLA-B27 RELATED AG QL: NEGATIVE

## 2021-08-11 LAB — ACE BLD-CCNC: 105 U/L

## 2021-08-17 DIAGNOSIS — R05 COUGH: ICD-10-CM

## 2021-08-17 DIAGNOSIS — R93.89 ABNORMAL FINDINGS ON DIAGNOSTIC IMAGING OF OTHER SPECIFIED BODY STRUCTURES: ICD-10-CM

## 2021-08-19 ENCOUNTER — APPOINTMENT (OUTPATIENT)
Dept: DERMATOLOGY | Facility: CLINIC | Age: 51
End: 2021-08-19
Payer: MEDICAID

## 2021-08-19 PROCEDURE — 99213 OFFICE O/P EST LOW 20 MIN: CPT

## 2021-08-19 NOTE — PHYSICAL EXAM
[Alert] : alert [Oriented x 3] : ~L oriented x 3 [Well Nourished] : well nourished [Conjunctiva Non-injected] : conjunctiva non-injected [No Visual Lymphadenopathy] : no visual  lymphadenopathy [No Clubbing] : no clubbing [No Edema] : no edema [No Bromhidrosis] : no bromhidrosis [No Chromhidrosis] : no chromhidrosis [FreeTextEntry3] : s/r site right thigh\par no nodules in previously noted areas\par no joint swelling

## 2021-08-19 NOTE — HISTORY OF PRESENT ILLNESS
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

## 2021-08-24 ENCOUNTER — APPOINTMENT (OUTPATIENT)
Dept: CT IMAGING | Facility: CLINIC | Age: 51
End: 2021-08-24
Payer: MEDICAID

## 2021-08-24 ENCOUNTER — RESULT REVIEW (OUTPATIENT)
Age: 51
End: 2021-08-24

## 2021-08-24 ENCOUNTER — APPOINTMENT (OUTPATIENT)
Dept: OPHTHALMOLOGY | Facility: CLINIC | Age: 51
End: 2021-08-24

## 2021-08-24 ENCOUNTER — OUTPATIENT (OUTPATIENT)
Dept: OUTPATIENT SERVICES | Facility: HOSPITAL | Age: 51
LOS: 1 days | End: 2021-08-24

## 2021-08-24 ENCOUNTER — TRANSCRIPTION ENCOUNTER (OUTPATIENT)
Age: 51
End: 2021-08-24

## 2021-08-24 DIAGNOSIS — S82.892A OTHER FRACTURE OF LEFT LOWER LEG, INITIAL ENCOUNTER FOR CLOSED FRACTURE: Chronic | ICD-10-CM

## 2021-08-24 PROCEDURE — 71250 CT THORAX DX C-: CPT | Mod: 26

## 2021-09-02 ENCOUNTER — APPOINTMENT (OUTPATIENT)
Dept: PULMONOLOGY | Facility: CLINIC | Age: 51
End: 2021-09-02
Payer: MEDICAID

## 2021-09-02 VITALS
BODY MASS INDEX: 22.34 KG/M2 | HEIGHT: 66 IN | DIASTOLIC BLOOD PRESSURE: 83 MMHG | OXYGEN SATURATION: 96 % | WEIGHT: 139 LBS | HEART RATE: 84 BPM | TEMPERATURE: 98 F | SYSTOLIC BLOOD PRESSURE: 123 MMHG

## 2021-09-02 PROCEDURE — 99204 OFFICE O/P NEW MOD 45 MIN: CPT

## 2021-09-02 RX ORDER — ALBUTEROL SULFATE 90 UG/1
108 (90 BASE) INHALANT RESPIRATORY (INHALATION)
Qty: 1 | Refills: 3 | Status: ACTIVE | COMMUNITY
Start: 2021-09-02 | End: 1900-01-01

## 2021-09-02 NOTE — REVIEW OF SYSTEMS
[SOB on Exertion] : sob on exertion [Arthralgias] : arthralgias [Anxiety] : anxiety [Thyroid Problem] : thyroid problem [Negative] : Endocrine

## 2021-09-02 NOTE — PHYSICAL EXAM
[No Acute Distress] : no acute distress [Normal Oropharynx] : normal oropharynx [II] : Mallampati Class: II [Normal Appearance] : normal appearance [Normal Rate/Rhythm] : normal rate/rhythm [Normal S1, S2] : normal s1, s2 [No Resp Distress] : no resp distress [Clear to Auscultation Bilaterally] : clear to auscultation bilaterally [No Abnormalities] : no abnormalities [Normal Gait] : normal gait [No Clubbing] : no clubbing [Normal Color/ Pigmentation] : normal color/ pigmentation [No Focal Deficits] : no focal deficits [Oriented x3] : oriented x3 [Normal Affect] : normal affect

## 2021-09-03 LAB
ALBUMIN SERPL ELPH-MCNC: 4.5 G/DL
ALP BLD-CCNC: 100 U/L
ALT SERPL-CCNC: 10 U/L
ANION GAP SERPL CALC-SCNC: 12 MMOL/L
AST SERPL-CCNC: 16 U/L
BASOPHILS # BLD AUTO: 0.06 K/UL
BASOPHILS NFR BLD AUTO: 0.9 %
BILIRUB SERPL-MCNC: 0.2 MG/DL
BUN SERPL-MCNC: 11 MG/DL
CALCIUM SERPL-MCNC: 10.3 MG/DL
CHLORIDE SERPL-SCNC: 101 MMOL/L
CO2 SERPL-SCNC: 27 MMOL/L
CREAT SERPL-MCNC: 0.69 MG/DL
EOSINOPHIL # BLD AUTO: 0.45 K/UL
EOSINOPHIL NFR BLD AUTO: 6.8 %
GLUCOSE SERPL-MCNC: 79 MG/DL
HCT VFR BLD CALC: 42 %
HGB BLD-MCNC: 13 G/DL
IMM GRANULOCYTES NFR BLD AUTO: 0.3 %
INR PPP: 0.99 RATIO
LYMPHOCYTES # BLD AUTO: 2.08 K/UL
LYMPHOCYTES NFR BLD AUTO: 31.4 %
MAN DIFF?: NORMAL
MCHC RBC-ENTMCNC: 29.1 PG
MCHC RBC-ENTMCNC: 31 GM/DL
MCV RBC AUTO: 94 FL
MONOCYTES # BLD AUTO: 0.37 K/UL
MONOCYTES NFR BLD AUTO: 5.6 %
NEUTROPHILS # BLD AUTO: 3.64 K/UL
NEUTROPHILS NFR BLD AUTO: 55 %
PLATELET # BLD AUTO: 372 K/UL
POTASSIUM SERPL-SCNC: 4.3 MMOL/L
PROT SERPL-MCNC: 7.3 G/DL
PT BLD: 11.7 SEC
RBC # BLD: 4.47 M/UL
RBC # FLD: 14.7 %
SODIUM SERPL-SCNC: 140 MMOL/L
WBC # FLD AUTO: 6.62 K/UL

## 2021-09-03 NOTE — HISTORY OF PRESENT ILLNESS
[Never] : never [TextBox_4] : 52 yo woman never smoker presents with abnormal CT chest. \par She reports GUAJARDO , started about 2 years back but she attributed to stress. Last year she was sick with COVID and had breathing issues for about 3 days. Since then she has noticed progressive GUAJARDO. She can walk many blocks but needs to stop after 3 flight of stairs which is a change. No chest pain / cough / wheezing / leg swelling. She never had any primary respiratory issues before.  She was seen by rheumatologist / Dr Parkinson for ankle swelling and dermatologist for painless nodules in the thigh. CT chest was performed after the skin biopsy result showed erythema nodosum. She was treated with steroids with improvement in her skin and joint manifestations. No history of living in the [facility]. No weight loss or loss of appetite.\par \par Never smoker\par social alcohol\par no recreational drug use\par no allergies\par Owns a pet food supply shop\par On Lexapro , Synthroid and PRN xanax. \par \par

## 2021-09-03 NOTE — CONSULT LETTER
[Dear  ___] : Dear  [unfilled], [Consult Letter:] : I had the pleasure of evaluating your patient, [unfilled]. [Please see my note below.] : Please see my note below. [Consult Closing:] : Thank you very much for allowing me to participate in the care of this patient.  If you have any questions, please do not hesitate to contact me. [Sincerely,] : Sincerely, [FreeTextEntry3] : Savana Tabor MD\par \par [employer] [facility]\par Pulmonary, Critical Care, and Sleep Medicine\par

## 2021-09-03 NOTE — REASON FOR VISIT
[Consultation] : a consultation [Abnormal CXR/ Chest CT] : an abnormal CXR/ chest CT [Shortness of Breath] : shortness of breath [TextBox_13] : Dr Linda Parkinson

## 2021-09-03 NOTE — ASSESSMENT
[FreeTextEntry1] : Data reviewed: \par 1. Patients Labs and imaging reviewed\par Pertinent findings LEXI  + 1:160, , CRP: 11, ESR : 40.\par 2. Punch biopsy right thigh 08/21/2021: Septal panniculitis, compatible with erythema nodosum. \par 3. CT chest 08/24/2021: \par - Some septal thickening \par - scattered micronodules\par - Mediastinal LAP.  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 CT chest findings as above. Harvey's usually has good prognosis, and 90% of patients go into full remission. Our prime suspicion remains Sarcoidosis but mediastinal LAP can have wide differential dx including lymphoma, fungal disease and patient prefers definitive diagnosis at this time. Patient agreeable for LN biopsy.  \par \par Plan: \par - EBUS guided TBNA of mediastinal LN. Referred to IP. IP team will call and schedule patient. \par - Will get PFT and 6MWT\par - ECHO\par - Pre Op blood work: CBC, CMP, INR/PT\par - She plans to see an ophthalmologist soon. \par - Albuterol prescribed for now. \par \par Follow up after EBUS and PFT/6MWT.\par

## 2021-09-07 ENCOUNTER — RESULT REVIEW (OUTPATIENT)
Age: 51
End: 2021-09-07

## 2021-09-07 ENCOUNTER — TRANSCRIPTION ENCOUNTER (OUTPATIENT)
Age: 51
End: 2021-09-07

## 2021-09-07 ENCOUNTER — APPOINTMENT (OUTPATIENT)
Dept: PULMONOLOGY | Facility: HOSPITAL | Age: 51
End: 2021-09-07

## 2021-09-07 ENCOUNTER — OUTPATIENT (OUTPATIENT)
Dept: OUTPATIENT SERVICES | Facility: HOSPITAL | Age: 51
LOS: 1 days | Discharge: HOME | End: 2021-09-07
Payer: MEDICAID

## 2021-09-07 ENCOUNTER — NON-APPOINTMENT (OUTPATIENT)
Age: 51
End: 2021-09-07

## 2021-09-07 DIAGNOSIS — S82.892A OTHER FRACTURE OF LEFT LOWER LEG, INITIAL ENCOUNTER FOR CLOSED FRACTURE: Chronic | ICD-10-CM

## 2021-09-07 LAB
APTT BLD: 34.8 SEC
B PERT IGG+IGM PNL SER: SIGNIFICANT CHANGE UP
COLOR FLD: SIGNIFICANT CHANGE UP
COMMENT - FLUIDS: SIGNIFICANT CHANGE UP
COMMENT - FLUIDS: SIGNIFICANT CHANGE UP
EOSINOPHIL # FLD: 1 % — SIGNIFICANT CHANGE UP
FLUID INTAKE SUBSTANCE CLASS: SIGNIFICANT CHANGE UP
FLUID SEGMENTED GRANULOCYTES: 2 % — SIGNIFICANT CHANGE UP
GRAM STN FLD: SIGNIFICANT CHANGE UP
GRAM STN FLD: SIGNIFICANT CHANGE UP
LYMPHOCYTES # FLD: 12 % — SIGNIFICANT CHANGE UP
MESOTHL CELL # FLD: 3 % — SIGNIFICANT CHANGE UP
MONOS+MACROS # FLD: 13 % — SIGNIFICANT CHANGE UP
RCV VOL RI: 29 /UL — HIGH (ref 0–0)
SPECIMEN SOURCE FLD: SIGNIFICANT CHANGE UP
SPECIMEN SOURCE: SIGNIFICANT CHANGE UP
SPECIMEN SOURCE: SIGNIFICANT CHANGE UP
TOTAL NUCLEATED CELL COUNT, BODY FLUID: 31 /UL — SIGNIFICANT CHANGE UP
TUBE TYPE: SIGNIFICANT CHANGE UP

## 2021-09-07 PROCEDURE — 31624 DX BRONCHOSCOPE/LAVAGE: CPT | Mod: GC

## 2021-09-07 PROCEDURE — 31652 BRONCH EBUS SAMPLNG 1/2 NODE: CPT | Mod: GC

## 2021-09-07 PROCEDURE — 88173 CYTOPATH EVAL FNA REPORT: CPT | Mod: 26

## 2021-09-07 PROCEDURE — 88305 TISSUE EXAM BY PATHOLOGIST: CPT | Mod: 26

## 2021-09-07 PROCEDURE — 88312 SPECIAL STAINS GROUP 1: CPT | Mod: 26

## 2021-09-07 PROCEDURE — 88112 CYTOPATH CELL ENHANCE TECH: CPT | Mod: 26,59

## 2021-09-08 LAB
NIGHT BLUE STAIN TISS: SIGNIFICANT CHANGE UP
NIGHT BLUE STAIN TISS: SIGNIFICANT CHANGE UP
NON-GYNECOLOGICAL CYTOLOGY STUDY: SIGNIFICANT CHANGE UP
NON-GYNECOLOGICAL CYTOLOGY STUDY: SIGNIFICANT CHANGE UP
SPECIMEN SOURCE: SIGNIFICANT CHANGE UP
SPECIMEN SOURCE: SIGNIFICANT CHANGE UP

## 2021-09-09 ENCOUNTER — NON-APPOINTMENT (OUTPATIENT)
Age: 51
End: 2021-09-09

## 2021-09-09 LAB
CULTURE RESULTS: NO GROWTH — SIGNIFICANT CHANGE UP
SPECIMEN SOURCE: SIGNIFICANT CHANGE UP

## 2021-09-09 PROCEDURE — 87102 FUNGUS ISOLATION CULTURE: CPT

## 2021-09-09 PROCEDURE — 88185 FLOWCYTOMETRY/TC ADD-ON: CPT

## 2021-09-09 PROCEDURE — 89051 BODY FLUID CELL COUNT: CPT

## 2021-09-09 PROCEDURE — 88112 CYTOPATH CELL ENHANCE TECH: CPT

## 2021-09-09 PROCEDURE — 31652 BRONCH EBUS SAMPLNG 1/2 NODE: CPT

## 2021-09-09 PROCEDURE — 88305 TISSUE EXAM BY PATHOLOGIST: CPT

## 2021-09-09 PROCEDURE — 87116 MYCOBACTERIA CULTURE: CPT

## 2021-09-09 PROCEDURE — 87015 SPECIMEN INFECT AGNT CONCNTJ: CPT

## 2021-09-09 PROCEDURE — 87205 SMEAR GRAM STAIN: CPT

## 2021-09-09 PROCEDURE — 87206 SMEAR FLUORESCENT/ACID STAI: CPT

## 2021-09-09 PROCEDURE — 87070 CULTURE OTHR SPECIMN AEROBIC: CPT

## 2021-09-09 PROCEDURE — 36415 COLL VENOUS BLD VENIPUNCTURE: CPT

## 2021-09-09 PROCEDURE — 88184 FLOWCYTOMETRY/ TC 1 MARKER: CPT

## 2021-09-09 PROCEDURE — 88188 FLOWCYTOMETRY/READ 9-15: CPT

## 2021-09-09 PROCEDURE — 88312 SPECIAL STAINS GROUP 1: CPT

## 2021-09-09 PROCEDURE — 31624 DX BRONCHOSCOPE/LAVAGE: CPT

## 2021-09-09 PROCEDURE — 87075 CULTR BACTERIA EXCEPT BLOOD: CPT

## 2021-09-09 PROCEDURE — 88173 CYTOPATH EVAL FNA REPORT: CPT

## 2021-09-10 ENCOUNTER — NON-APPOINTMENT (OUTPATIENT)
Age: 51
End: 2021-09-10

## 2021-09-13 ENCOUNTER — NON-APPOINTMENT (OUTPATIENT)
Age: 51
End: 2021-09-13

## 2021-09-13 LAB
CULTURE RESULTS: NO GROWTH — SIGNIFICANT CHANGE UP
SPECIMEN SOURCE: SIGNIFICANT CHANGE UP
TM INTERPRETATION: SIGNIFICANT CHANGE UP

## 2021-09-14 ENCOUNTER — NON-APPOINTMENT (OUTPATIENT)
Age: 51
End: 2021-09-14

## 2021-09-16 ENCOUNTER — APPOINTMENT (OUTPATIENT)
Dept: PULMONOLOGY | Facility: CLINIC | Age: 51
End: 2021-09-16
Payer: MEDICAID

## 2021-09-16 PROCEDURE — 94729 DIFFUSING CAPACITY: CPT

## 2021-09-16 PROCEDURE — 94060 EVALUATION OF WHEEZING: CPT

## 2021-09-16 PROCEDURE — 94726 PLETHYSMOGRAPHY LUNG VOLUMES: CPT

## 2021-09-16 PROCEDURE — 94618 PULMONARY STRESS TESTING: CPT

## 2021-09-17 ENCOUNTER — APPOINTMENT (OUTPATIENT)
Dept: PULMONOLOGY | Facility: CLINIC | Age: 51
End: 2021-09-17
Payer: MEDICAID

## 2021-09-17 PROCEDURE — 99443: CPT

## 2021-09-17 RX ORDER — FLUTICASONE FUROATE 100 UG/1
100 POWDER RESPIRATORY (INHALATION) DAILY
Qty: 1 | Refills: 4 | Status: ACTIVE | COMMUNITY
Start: 2021-09-17 | End: 1900-01-01

## 2021-09-17 RX ORDER — METHYLPREDNISOLONE 4 MG/1
4 TABLET ORAL
Qty: 1 | Refills: 0 | Status: DISCONTINUED | COMMUNITY
Start: 2021-08-04 | End: 2021-09-17

## 2021-09-17 NOTE — ASSESSMENT
[FreeTextEntry1] : REVIEWED\par 1. EBUS/TBNA bx results\par 9/7/2021\par *LN7, granulomatous inflammation\par *fungal stains negative\par *AFB stains negative\par 2.PFTs from 9/16/2021 reviewed\par FEV1 124; FEV1/FVC 77; ; DLCO 82; no BD response\par 3. 6MWT from 9/16/2021 reviewed\par no desaturation; 541 meters walked\par 4. Patients Labs and imaging reviewed\par Pertinent findings LEXI + 1:160, , CRP: 11, ESR : 40.\par 5. Punch biopsy right thigh 08/21/2021: Septal panniculitis, compatible with erythema nodosum. \par 6. 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 Had EBUS/TBNA to exclude fungal infection and lymphoma; pathology from TBNA c/w sarcoidosis.PFTs w/o obstruction. Six minute walk test without desaturation.  Will trial ICS for now. Most Harvey's patients go into remission. Answered all questions and reassurance provided. Should follow up with me in 4 weeks after daily ICS use.

## 2021-09-17 NOTE — HISTORY OF PRESENT ILLNESS
[Home] : at home, [unfilled] , at the time of the visit. [Medical Office: ([facility])___] : at the medical office located in  [Verbal consent obtained from patient] : the patient, [unfilled] [Never] : never [TextBox_4] : 50 yo woman never smoker presents with abnormal CT chest. \par She reports GUAJARDO , started about 2 years back but she attributed to stress. Last year she was sick with COVID and had breathing issues for about 3 days. Since then she has noticed progressive GUAJARDO. She can walk many blocks but needs to stop after 3 flight of stairs which is a change. No chest pain / cough / wheezing / leg swelling. She never had any primary respiratory issues before.  She was seen by rheumatologist / Dr Parkinson for ankle swelling and dermatologist for painless nodules in the thigh. CT chest was performed after the skin biopsy result showed erythema nodosum. She was treated with steroids with improvement in her skin and joint manifestations. No history of living in the [facility]. No weight loss or loss of appetite.\par \par Never smoker\par social alcohol\par no recreational drug use\par no allergies\par Owns a pet food supply shop\par On Lexapro , Synthroid and PRN xanax. \par \par 9/17/2021\par Had EBUS/TBNA, uncomplicated. Had PFTs and 6MWT. Has been worried about biopsy results so has been SOB but is not sure if it is truly lung related. Some TIM use.\par

## 2021-09-17 NOTE — PHYSICAL EXAM
Raul Cutler called to request a refill on his medication.       Last office visit : 2/27/2020   Next office visit : 11/24/2020     Requested Prescriptions     Pending Prescriptions Disp Refills    mesalamine (PENTASA) 250 MG extended release capsule 1080 capsule 1     Sig: TAKE THREE CAPSULES BY MOUTH FOUR TIMES DAILY    hydroCHLOROthiazide (HYDRODIURIL) 12.5 MG tablet 90 tablet 1     Sig: TAKE ONE TABLET BY MOUTH DAILY            Slime Shankar MA [TextBox_2] : *****full physical exam could not be performed due to this being a TeleHealth visit

## 2021-09-20 ENCOUNTER — APPOINTMENT (OUTPATIENT)
Dept: PULMONOLOGY | Facility: CLINIC | Age: 51
End: 2021-09-20

## 2021-09-22 ENCOUNTER — APPOINTMENT (OUTPATIENT)
Dept: HEART AND VASCULAR | Facility: CLINIC | Age: 51
End: 2021-09-22

## 2021-09-22 ENCOUNTER — NON-APPOINTMENT (OUTPATIENT)
Age: 51
End: 2021-09-22

## 2021-09-22 ENCOUNTER — APPOINTMENT (OUTPATIENT)
Dept: INTERNAL MEDICINE | Facility: CLINIC | Age: 51
End: 2021-09-22
Payer: MEDICAID

## 2021-09-22 VITALS
TEMPERATURE: 97 F | OXYGEN SATURATION: 98 % | WEIGHT: 138 LBS | BODY MASS INDEX: 22.18 KG/M2 | HEIGHT: 66 IN | SYSTOLIC BLOOD PRESSURE: 112 MMHG | HEART RATE: 76 BPM | DIASTOLIC BLOOD PRESSURE: 79 MMHG

## 2021-09-22 DIAGNOSIS — S82.832A OTHER FRACTURE OF UPPER AND LOWER END OF LEFT FIBULA, INITIAL ENCOUNTER FOR CLOSED FRACTURE: ICD-10-CM

## 2021-09-22 DIAGNOSIS — Z82.49 FAMILY HISTORY OF ISCHEMIC HEART DISEASE AND OTHER DISEASES OF THE CIRCULATORY SYSTEM: ICD-10-CM

## 2021-09-22 DIAGNOSIS — M25.571 PAIN IN RIGHT ANKLE AND JOINTS OF RIGHT FOOT: ICD-10-CM

## 2021-09-22 DIAGNOSIS — M25.471 EFFUSION, RIGHT ANKLE: ICD-10-CM

## 2021-09-22 DIAGNOSIS — Z80.8 FAMILY HISTORY OF MALIGNANT NEOPLASM OF OTHER ORGANS OR SYSTEMS: ICD-10-CM

## 2021-09-22 DIAGNOSIS — Z87.01 PERSONAL HISTORY OF PNEUMONIA (RECURRENT): ICD-10-CM

## 2021-09-22 DIAGNOSIS — M25.561 PAIN IN RIGHT KNEE: ICD-10-CM

## 2021-09-22 DIAGNOSIS — M25.562 PAIN IN RIGHT KNEE: ICD-10-CM

## 2021-09-22 DIAGNOSIS — M95.8 OTHER SPECIFIED ACQUIRED DEFORMITIES OF MUSCULOSKELETAL SYSTEM: ICD-10-CM

## 2021-09-22 DIAGNOSIS — Z23 ENCOUNTER FOR IMMUNIZATION: ICD-10-CM

## 2021-09-22 DIAGNOSIS — Z86.03 PERSONAL HISTORY OF NEOPLASM OF UNCERTAIN BEHAVIOR: ICD-10-CM

## 2021-09-22 DIAGNOSIS — Z83.511 FAMILY HISTORY OF GLAUCOMA: ICD-10-CM

## 2021-09-22 DIAGNOSIS — S82.842A DISPLACED BIMALLEOLAR FRACTURE OF LEFT LOWER LEG, INITIAL ENCOUNTER FOR CLOSED FRACTURE: ICD-10-CM

## 2021-09-22 DIAGNOSIS — Z83.3 FAMILY HISTORY OF DIABETES MELLITUS: ICD-10-CM

## 2021-09-22 DIAGNOSIS — Z12.4 ENCOUNTER FOR SCREENING FOR MALIGNANT NEOPLASM OF CERVIX: ICD-10-CM

## 2021-09-22 DIAGNOSIS — M89.9 DISORDER OF BONE, UNSPECIFIED: ICD-10-CM

## 2021-09-22 PROCEDURE — 90686 IIV4 VACC NO PRSV 0.5 ML IM: CPT

## 2021-09-22 PROCEDURE — 36415 COLL VENOUS BLD VENIPUNCTURE: CPT

## 2021-09-22 PROCEDURE — 93000 ELECTROCARDIOGRAM COMPLETE: CPT

## 2021-09-22 PROCEDURE — 99386 PREV VISIT NEW AGE 40-64: CPT | Mod: 25

## 2021-09-22 PROCEDURE — G0008: CPT

## 2021-09-22 RX ORDER — KETOROLAC TROMETHAMINE 10 MG/1
10 TABLET, FILM COATED ORAL EVERY 8 HOURS
Qty: 15 | Refills: 0 | Status: DISCONTINUED | COMMUNITY
Start: 2021-07-14 | End: 2021-09-22

## 2021-09-22 RX ORDER — TRAMADOL HYDROCHLORIDE 50 MG/1
50 TABLET, COATED ORAL
Qty: 21 | Refills: 0 | Status: DISCONTINUED | COMMUNITY
Start: 2021-07-22 | End: 2021-09-22

## 2021-09-22 RX ORDER — ALPRAZOLAM 0.5 MG/1
0.5 TABLET ORAL
Refills: 0 | Status: ACTIVE | COMMUNITY

## 2021-09-22 RX ORDER — PSYLLIUM SEED
PACKET (EA) ORAL
Refills: 0 | Status: ACTIVE | COMMUNITY

## 2021-09-22 NOTE — HEALTH RISK ASSESSMENT
[Monthly or less (1 pt)] : Monthly or less (1 point) [3 or 4 (1 pt)] : 3 or 4  (1 point) [Never (0 pts)] : Never (0 points) [Yes] : In the past 12 months have you used drugs other than those required for medical reasons? Yes [0] : 2) Feeling down, depressed, or hopeless: Not at all (0) [PHQ-2 Negative - No further assessment needed] : PHQ-2 Negative - No further assessment needed [No Retinopathy] : No retinopathy [Patient reported mammogram was normal] : Patient reported mammogram was normal [Patient reported PAP Smear was normal] : Patient reported PAP Smear was normal [Patient reported colonoscopy was normal] : Patient reported colonoscopy was normal [HIV test declined] : HIV test declined [With Family] : lives with family [Employed] : employed [] :  [Significant Other] : lives with significant other [Sexually Active] : sexually active [Feels Safe at Home] : Feels safe at home [Fully functional (bathing, dressing, toileting, transferring, walking, feeding)] : Fully functional (bathing, dressing, toileting, transferring, walking, feeding) [Fully functional (using the telephone, shopping, preparing meals, housekeeping, doing laundry, using] : Fully functional and needs no help or supervision to perform IADLs (using the telephone, shopping, preparing meals, housekeeping, doing laundry, using transportation, managing medications and managing finances) [Smoke Detector] : smoke detector [Carbon Monoxide Detector] : carbon monoxide detector [Seat Belt] :  uses seat belt [Sunscreen] : uses sunscreen [Hepatitis C test declined] : Hepatitis C test declined [] : No [Audit-CScore] : 2  [de-identified] : marijuana [[id number]] : 0  [EyeExamDate] : 1/2019 [Reports changes in hearing] : Reports no changes in hearing [Reports changes in vision] : Reports no changes in vision [Reports changes in dental health] : Reports no changes in dental health [Guns at Home] : no guns at home [MammogramDate] : 5/2021 [PapSmearDate] : 12/2021 [ColonoscopyDate] : 1/2019 [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

## 2021-09-22 NOTE — PHYSICAL EXAM
[No Acute Distress] : no acute distress [Normal Sclera/Conjunctiva] : normal sclera/conjunctiva [No Lymphadenopathy] : no lymphadenopathy [Clear to Auscultation] : lungs were clear to auscultation bilaterally [Normal Rate] : normal rate  [Regular Rhythm] : with a regular rhythm [Pedal Pulses Present] : the pedal pulses are present [No Edema] : there was no peripheral edema [No CVA Tenderness] : no CVA  tenderness [No Rash] : no rash [Normal] : affect was normal and insight and judgment were intact [Soft] : abdomen soft [Non Tender] : non-tender [Normal Appearance] : normal in appearance [No Masses] : no palpable masses [No Nipple Discharge] : no nipple discharge

## 2021-09-22 NOTE — HISTORY OF PRESENT ILLNESS
[FreeTextEntry1] : 1. Pt presents to establish Primary Care and is requesting an Annual Physical.\par 2. completed COVID vaccine

## 2021-09-24 ENCOUNTER — NON-APPOINTMENT (OUTPATIENT)
Age: 51
End: 2021-09-24

## 2021-09-24 LAB
25(OH)D3 SERPL-MCNC: 29.3 NG/ML
ALBUMIN SERPL ELPH-MCNC: 4.2 G/DL
ALP BLD-CCNC: 77 U/L
ALT SERPL-CCNC: 12 U/L
ANION GAP SERPL CALC-SCNC: 12 MMOL/L
APPEARANCE: CLEAR
AST SERPL-CCNC: 14 U/L
BACTERIA: NEGATIVE
BASOPHILS # BLD AUTO: 0.05 K/UL
BASOPHILS NFR BLD AUTO: 0.8 %
BILIRUB SERPL-MCNC: 0.2 MG/DL
BILIRUBIN URINE: NEGATIVE
BLOOD URINE: NEGATIVE
BUN SERPL-MCNC: 12 MG/DL
CALCIUM SERPL-MCNC: 9.5 MG/DL
CHLORIDE SERPL-SCNC: 103 MMOL/L
CHOLEST SERPL-MCNC: 159 MG/DL
CO2 SERPL-SCNC: 24 MMOL/L
COLOR: NORMAL
CREAT SERPL-MCNC: 0.65 MG/DL
EOSINOPHIL # BLD AUTO: 0.4 K/UL
EOSINOPHIL NFR BLD AUTO: 6.3 %
ESTIMATED AVERAGE GLUCOSE: 105 MG/DL
FOLATE SERPL-MCNC: 15.2 NG/ML
GLUCOSE QUALITATIVE U: NEGATIVE
GLUCOSE SERPL-MCNC: 81 MG/DL
HBA1C MFR BLD HPLC: 5.3 %
HCT VFR BLD CALC: 40.1 %
HDLC SERPL-MCNC: 48 MG/DL
HGB BLD-MCNC: 12.5 G/DL
HYALINE CASTS: 2 /LPF
IMM GRANULOCYTES NFR BLD AUTO: 0.5 %
KETONES URINE: NEGATIVE
LDLC SERPL CALC-MCNC: 92 MG/DL
LEUKOCYTE ESTERASE URINE: ABNORMAL
LYMPHOCYTES # BLD AUTO: 1.48 K/UL
LYMPHOCYTES NFR BLD AUTO: 23.4 %
MAN DIFF?: NORMAL
MCHC RBC-ENTMCNC: 28.9 PG
MCHC RBC-ENTMCNC: 31.2 GM/DL
MCV RBC AUTO: 92.6 FL
MICROSCOPIC-UA: NORMAL
MONOCYTES # BLD AUTO: 0.32 K/UL
MONOCYTES NFR BLD AUTO: 5.1 %
NEUTROPHILS # BLD AUTO: 4.05 K/UL
NEUTROPHILS NFR BLD AUTO: 63.9 %
NITRITE URINE: NEGATIVE
NONHDLC SERPL-MCNC: 111 MG/DL
PH URINE: 5.5
PLATELET # BLD AUTO: 339 K/UL
POTASSIUM SERPL-SCNC: 4.3 MMOL/L
PROT SERPL-MCNC: 6.7 G/DL
PROTEIN URINE: NEGATIVE
RBC # BLD: 4.33 M/UL
RBC # FLD: 14.6 %
RED BLOOD CELLS URINE: 0 /HPF
SODIUM SERPL-SCNC: 139 MMOL/L
SPECIFIC GRAVITY URINE: 1.01
SQUAMOUS EPITHELIAL CELLS: 3 /HPF
T PALLIDUM AB SER QL IA: NEGATIVE
TRIGL SERPL-MCNC: 92 MG/DL
TSH SERPL-ACNC: 0.37 UIU/ML
UROBILINOGEN URINE: NORMAL
VIT B12 SERPL-MCNC: 530 PG/ML
WBC # FLD AUTO: 6.33 K/UL
WHITE BLOOD CELLS URINE: 3 /HPF

## 2021-09-25 LAB — HEMOCCULT STL QL IA: NEGATIVE

## 2021-09-27 ENCOUNTER — NON-APPOINTMENT (OUTPATIENT)
Age: 51
End: 2021-09-27

## 2021-10-19 ENCOUNTER — APPOINTMENT (OUTPATIENT)
Dept: INTERNAL MEDICINE | Facility: CLINIC | Age: 51
End: 2021-10-19

## 2021-10-22 ENCOUNTER — APPOINTMENT (OUTPATIENT)
Dept: OPHTHALMOLOGY | Facility: CLINIC | Age: 51
End: 2021-10-22

## 2021-12-23 ENCOUNTER — NON-APPOINTMENT (OUTPATIENT)
Age: 51
End: 2021-12-23

## 2022-06-03 LAB — NEUTROPHILS-BODY FLUID: 2 % — SIGNIFICANT CHANGE UP

## 2023-09-18 ENCOUNTER — APPOINTMENT (OUTPATIENT)
Dept: FAMILY MEDICINE | Facility: CLINIC | Age: 53
End: 2023-09-18
Payer: MEDICAID

## 2023-09-18 ENCOUNTER — NON-APPOINTMENT (OUTPATIENT)
Age: 53
End: 2023-09-18

## 2023-09-18 VITALS
HEIGHT: 66 IN | HEART RATE: 73 BPM | WEIGHT: 148 LBS | SYSTOLIC BLOOD PRESSURE: 117 MMHG | TEMPERATURE: 98.3 F | OXYGEN SATURATION: 96 % | BODY MASS INDEX: 23.78 KG/M2 | DIASTOLIC BLOOD PRESSURE: 84 MMHG | RESPIRATION RATE: 16 BRPM

## 2023-09-18 DIAGNOSIS — R48.0 DYSLEXIA AND ALEXIA: ICD-10-CM

## 2023-09-18 DIAGNOSIS — Z86.59 PERSONAL HISTORY OF OTHER MENTAL AND BEHAVIORAL DISORDERS: ICD-10-CM

## 2023-09-18 DIAGNOSIS — Z78.9 OTHER SPECIFIED HEALTH STATUS: ICD-10-CM

## 2023-09-18 DIAGNOSIS — Z00.00 ENCOUNTER FOR GENERAL ADULT MEDICAL EXAMINATION W/OUT ABNORMAL FINDINGS: ICD-10-CM

## 2023-09-18 PROCEDURE — 36415 COLL VENOUS BLD VENIPUNCTURE: CPT

## 2023-09-18 PROCEDURE — 93000 ELECTROCARDIOGRAM COMPLETE: CPT

## 2023-09-18 PROCEDURE — 99386 PREV VISIT NEW AGE 40-64: CPT | Mod: 25

## 2023-09-18 PROCEDURE — 99203 OFFICE O/P NEW LOW 30 MIN: CPT | Mod: 25

## 2023-09-18 RX ORDER — DEXTROAMPHETAMINE SACCHARATE, AMPHETAMINE ASPARTATE, DEXTROAMPHETAMINE SULFATE, AND AMPHETAMINE SULFATE 1.25; 1.25; 1.25; 1.25 MG/1; MG/1; MG/1; MG/1
5 TABLET ORAL
Refills: 0 | Status: ACTIVE | COMMUNITY

## 2023-09-18 RX ORDER — LEVOTHYROXINE SODIUM 0.07 MG/1
75 TABLET ORAL
Refills: 0 | Status: ACTIVE | COMMUNITY

## 2023-09-18 RX ORDER — ESCITALOPRAM OXALATE 20 MG/1
20 TABLET ORAL
Qty: 90 | Refills: 1 | Status: ACTIVE | COMMUNITY

## 2023-09-19 LAB
ALBUMIN SERPL ELPH-MCNC: 4.7 G/DL
ALP BLD-CCNC: 93 U/L
ALT SERPL-CCNC: 12 U/L
ANION GAP SERPL CALC-SCNC: 12 MMOL/L
AST SERPL-CCNC: 14 U/L
BILIRUB SERPL-MCNC: <0.2 MG/DL
BUN SERPL-MCNC: 17 MG/DL
CALCIUM SERPL-MCNC: 9.8 MG/DL
CHLORIDE SERPL-SCNC: 103 MMOL/L
CHOLEST SERPL-MCNC: 237 MG/DL
CO2 SERPL-SCNC: 25 MMOL/L
CREAT SERPL-MCNC: 0.84 MG/DL
EGFR: 84 ML/MIN/1.73M2
ESTIMATED AVERAGE GLUCOSE: 111 MG/DL
GLUCOSE SERPL-MCNC: 91 MG/DL
HBA1C MFR BLD HPLC: 5.5 %
HDLC SERPL-MCNC: 51 MG/DL
LDLC SERPL CALC-MCNC: 153 MG/DL
NONHDLC SERPL-MCNC: 186 MG/DL
POTASSIUM SERPL-SCNC: 4.1 MMOL/L
PROT SERPL-MCNC: 6.9 G/DL
SODIUM SERPL-SCNC: 140 MMOL/L
TRIGL SERPL-MCNC: 185 MG/DL
TSH SERPL-ACNC: 1.94 UIU/ML
VIT B12 SERPL-MCNC: 535 PG/ML

## 2023-09-20 NOTE — ED PROVIDER NOTE - MUSCULOSKELETAL, MLM
no medial or lateral malleolar tenderness, Surgical site is well healed but with and erythema. minimum assist (75% patients effort)

## 2023-09-27 ENCOUNTER — APPOINTMENT (OUTPATIENT)
Dept: HEART AND VASCULAR | Facility: CLINIC | Age: 53
End: 2023-09-27
Payer: MEDICAID

## 2023-09-27 ENCOUNTER — NON-APPOINTMENT (OUTPATIENT)
Age: 53
End: 2023-09-27

## 2023-09-27 VITALS
BODY MASS INDEX: 23.63 KG/M2 | DIASTOLIC BLOOD PRESSURE: 84 MMHG | SYSTOLIC BLOOD PRESSURE: 132 MMHG | WEIGHT: 147 LBS | TEMPERATURE: 98 F | OXYGEN SATURATION: 98 % | RESPIRATION RATE: 15 BRPM | HEART RATE: 74 BPM | HEIGHT: 66 IN

## 2023-09-27 DIAGNOSIS — R07.9 CHEST PAIN, UNSPECIFIED: ICD-10-CM

## 2023-09-27 DIAGNOSIS — R06.02 SHORTNESS OF BREATH: ICD-10-CM

## 2023-09-27 DIAGNOSIS — E78.5 HYPERLIPIDEMIA, UNSPECIFIED: ICD-10-CM

## 2023-09-27 PROCEDURE — 99204 OFFICE O/P NEW MOD 45 MIN: CPT | Mod: 25

## 2023-09-27 PROCEDURE — 93000 ELECTROCARDIOGRAM COMPLETE: CPT

## 2023-09-30 PROBLEM — Z00.00 ENCOUNTER FOR PREVENTIVE HEALTH EXAMINATION: Status: ACTIVE | Noted: 2019-01-03

## 2023-10-03 ENCOUNTER — APPOINTMENT (OUTPATIENT)
Dept: PULMONOLOGY | Facility: CLINIC | Age: 53
End: 2023-10-03
Payer: MEDICAID

## 2023-10-03 VITALS
WEIGHT: 148 LBS | BODY MASS INDEX: 23.78 KG/M2 | HEIGHT: 66 IN | RESPIRATION RATE: 14 BRPM | HEART RATE: 75 BPM | OXYGEN SATURATION: 96 % | SYSTOLIC BLOOD PRESSURE: 118 MMHG | DIASTOLIC BLOOD PRESSURE: 82 MMHG

## 2023-10-03 DIAGNOSIS — M35.00 SICCA SYNDROME, UNSPECIFIED: ICD-10-CM

## 2023-10-03 DIAGNOSIS — Z83.49 FAMILY HISTORY OF OTHER ENDOCRINE, NUTRITIONAL AND METABOLIC DISEASES: ICD-10-CM

## 2023-10-03 DIAGNOSIS — L52 ERYTHEMA NODOSUM: ICD-10-CM

## 2023-10-03 DIAGNOSIS — D86.9 SARCOIDOSIS, UNSPECIFIED: ICD-10-CM

## 2023-10-03 DIAGNOSIS — Z86.39 PERSONAL HISTORY OF OTHER ENDOCRINE, NUTRITIONAL AND METABOLIC DISEASE: ICD-10-CM

## 2023-10-03 DIAGNOSIS — U07.1 COVID-19: ICD-10-CM

## 2023-10-03 DIAGNOSIS — S93.432A SPRAIN OF TIBIOFIBULAR LIGAMENT OF LEFT ANKLE, INITIAL ENCOUNTER: ICD-10-CM

## 2023-10-03 DIAGNOSIS — Z83.3 FAMILY HISTORY OF DIABETES MELLITUS: ICD-10-CM

## 2023-10-03 DIAGNOSIS — F32.A ANXIETY DISORDER, UNSPECIFIED: ICD-10-CM

## 2023-10-03 DIAGNOSIS — Z82.49 FAMILY HISTORY OF ISCHEMIC HEART DISEASE AND OTHER DISEASES OF THE CIRCULATORY SYSTEM: ICD-10-CM

## 2023-10-03 DIAGNOSIS — Z82.3 FAMILY HISTORY OF STROKE: ICD-10-CM

## 2023-10-03 DIAGNOSIS — E06.3 AUTOIMMUNE THYROIDITIS: ICD-10-CM

## 2023-10-03 DIAGNOSIS — F41.9 ANXIETY DISORDER, UNSPECIFIED: ICD-10-CM

## 2023-10-03 LAB
BASOPHILS # BLD AUTO: 0.08 K/UL
BASOPHILS NFR BLD AUTO: 1 %
EOSINOPHIL # BLD AUTO: 0.27 K/UL
EOSINOPHIL NFR BLD AUTO: 3.4 %
HCT VFR BLD CALC: 39.7 %
HGB BLD-MCNC: 12.7 G/DL
IMM GRANULOCYTES NFR BLD AUTO: 0.4 %
LYMPHOCYTES # BLD AUTO: 2.7 K/UL
LYMPHOCYTES NFR BLD AUTO: 33.5 %
MAN DIFF?: NORMAL
MCHC RBC-ENTMCNC: 29.5 PG
MCHC RBC-ENTMCNC: 32 GM/DL
MCV RBC AUTO: 92.1 FL
MONOCYTES # BLD AUTO: 0.41 K/UL
MONOCYTES NFR BLD AUTO: 5.1 %
NEUTROPHILS # BLD AUTO: 4.56 K/UL
NEUTROPHILS NFR BLD AUTO: 56.6 %
PLATELET # BLD AUTO: 329 K/UL
RBC # BLD: 4.31 M/UL
RBC # FLD: 14.5 %
WBC # FLD AUTO: 8.05 K/UL

## 2023-10-03 PROCEDURE — 94729 DIFFUSING CAPACITY: CPT

## 2023-10-03 PROCEDURE — 94060 EVALUATION OF WHEEZING: CPT

## 2023-10-03 PROCEDURE — 94726 PLETHYSMOGRAPHY LUNG VOLUMES: CPT

## 2023-10-03 PROCEDURE — 99243 OFF/OP CNSLTJ NEW/EST LOW 30: CPT

## 2023-10-25 ENCOUNTER — APPOINTMENT (OUTPATIENT)
Dept: HEART AND VASCULAR | Facility: CLINIC | Age: 53
End: 2023-10-25

## 2023-11-06 ENCOUNTER — APPOINTMENT (OUTPATIENT)
Dept: HEART AND VASCULAR | Facility: CLINIC | Age: 53
End: 2023-11-06
Payer: MEDICAID

## 2023-11-06 PROCEDURE — 93351 STRESS TTE COMPLETE: CPT

## 2023-11-06 PROCEDURE — 93306 TTE W/DOPPLER COMPLETE: CPT | Mod: 59

## 2024-10-22 ENCOUNTER — APPOINTMENT (OUTPATIENT)
Age: 54
End: 2024-10-22
Payer: MEDICAID

## 2024-10-22 VITALS
HEIGHT: 66 IN | OXYGEN SATURATION: 98 % | WEIGHT: 145 LBS | DIASTOLIC BLOOD PRESSURE: 90 MMHG | TEMPERATURE: 97 F | HEART RATE: 92 BPM | RESPIRATION RATE: 16 BRPM | BODY MASS INDEX: 23.3 KG/M2 | SYSTOLIC BLOOD PRESSURE: 133 MMHG

## 2024-10-22 DIAGNOSIS — K59.00 CONSTIPATION, UNSPECIFIED: ICD-10-CM

## 2024-10-22 PROCEDURE — G2211 COMPLEX E/M VISIT ADD ON: CPT | Mod: NC

## 2024-10-22 PROCEDURE — 99213 OFFICE O/P EST LOW 20 MIN: CPT

## 2024-10-22 RX ORDER — DOCUSATE SODIUM 100 MG/1
100 CAPSULE ORAL
Qty: 1 | Refills: 0 | Status: ACTIVE | COMMUNITY
Start: 2024-10-22 | End: 1900-01-01

## 2024-10-22 RX ORDER — LORATADINE 10 MG
17 TABLET,DISINTEGRATING ORAL
Qty: 1 | Refills: 1 | Status: ACTIVE | COMMUNITY
Start: 2024-10-22 | End: 1900-01-01

## 2025-03-18 ENCOUNTER — APPOINTMENT (OUTPATIENT)
Dept: RHEUMATOLOGY | Facility: CLINIC | Age: 55
End: 2025-03-18
Payer: MEDICAID

## 2025-03-18 VITALS
RESPIRATION RATE: 16 BRPM | HEIGHT: 66 IN | OXYGEN SATURATION: 98 % | BODY MASS INDEX: 23.14 KG/M2 | WEIGHT: 144 LBS | SYSTOLIC BLOOD PRESSURE: 122 MMHG | TEMPERATURE: 98 F | HEART RATE: 80 BPM | DIASTOLIC BLOOD PRESSURE: 83 MMHG

## 2025-03-18 DIAGNOSIS — D86.9 SARCOIDOSIS, UNSPECIFIED: ICD-10-CM

## 2025-03-18 DIAGNOSIS — M25.50 PAIN IN UNSPECIFIED JOINT: ICD-10-CM

## 2025-03-18 DIAGNOSIS — E55.9 VITAMIN D DEFICIENCY, UNSPECIFIED: ICD-10-CM

## 2025-03-18 DIAGNOSIS — R22.9 LOCALIZED SWELLING, MASS AND LUMP, UNSPECIFIED: ICD-10-CM

## 2025-03-18 PROCEDURE — 99204 OFFICE O/P NEW MOD 45 MIN: CPT

## 2025-03-27 LAB
24R-OH-CALCIDIOL SERPL-MCNC: 42.6 PG/ML
ACE BLD-CCNC: 68 U/L
ALBUMIN SERPL ELPH-MCNC: 4.6 G/DL
ALP BLD-CCNC: 96 U/L
ALT SERPL-CCNC: 12 U/L
ANA SER IF-ACNC: NEGATIVE
ANION GAP SERPL CALC-SCNC: 11 MMOL/L
APPEARANCE: CLEAR
AST SERPL-CCNC: 14 U/L
BACTERIA: NEGATIVE /HPF
BASOPHILS # BLD AUTO: 0.04 K/UL
BASOPHILS NFR BLD AUTO: 0.6 %
BILIRUB SERPL-MCNC: 0.2 MG/DL
BILIRUBIN URINE: NEGATIVE
BLOOD URINE: NEGATIVE
BUN SERPL-MCNC: 12 MG/DL
C3 SERPL-MCNC: 123 MG/DL
C4 SERPL-MCNC: 24 MG/DL
CALCIUM SERPL-MCNC: 10.3 MG/DL
CAST: 0 /LPF
CCP AB SER IA-ACNC: <8 U/ML
CHLORIDE SERPL-SCNC: 99 MMOL/L
CK SERPL-CCNC: 56 U/L
CO2 SERPL-SCNC: 27 MMOL/L
COLOR: YELLOW
CREAT SERPL-MCNC: 0.62 MG/DL
CREAT SPEC-SCNC: 63 MG/DL
CREAT/PROT UR: 0.1 RATIO
CRP SERPL-MCNC: <3 MG/L
DSDNA AB SER-ACNC: <1 IU/ML
EGFRCR SERPLBLD CKD-EPI 2021: 106 ML/MIN/1.73M2
ENA RNP AB SER IA-ACNC: <0.2 AL
ENA SM AB SER IA-ACNC: <0.2 AL
ENA SS-A AB SER IA-ACNC: <0.2 AL
ENA SS-B AB SER IA-ACNC: <0.2 AL
EOSINOPHIL # BLD AUTO: 0.27 K/UL
EOSINOPHIL NFR BLD AUTO: 4 %
EPITHELIAL CELLS: 4 /HPF
ERYTHROCYTE [SEDIMENTATION RATE] IN BLOOD BY WESTERGREN METHOD: 8 MM/HR
G6PD SER-CCNC: 16.6 U/G HGB
GLUCOSE QUALITATIVE U: NEGATIVE MG/DL
GLUCOSE SERPL-MCNC: 99 MG/DL
HCT VFR BLD CALC: 39 %
HGB BLD-MCNC: 12.3 G/DL
IMM GRANULOCYTES NFR BLD AUTO: 0.9 %
KETONES URINE: NEGATIVE MG/DL
LEUKOCYTE ESTERASE URINE: ABNORMAL
LYMPHOCYTES # BLD AUTO: 2.15 K/UL
LYMPHOCYTES NFR BLD AUTO: 31.5 %
MAN DIFF?: NORMAL
MCHC RBC-ENTMCNC: 29.6 PG
MCHC RBC-ENTMCNC: 31.5 G/DL
MCV RBC AUTO: 93.8 FL
MICROSCOPIC-UA: NORMAL
MONOCYTES # BLD AUTO: 0.35 K/UL
MONOCYTES NFR BLD AUTO: 5.1 %
NEUTROPHILS # BLD AUTO: 3.95 K/UL
NEUTROPHILS NFR BLD AUTO: 57.9 %
NITRITE URINE: NEGATIVE
PH URINE: 6
PLATELET # BLD AUTO: 341 K/UL
POTASSIUM SERPL-SCNC: 4.2 MMOL/L
PROT SERPL-MCNC: 7.2 G/DL
PROT UR-MCNC: 7 MG/DL
PROTEIN URINE: NEGATIVE MG/DL
RBC # BLD: 4.16 M/UL
RBC # FLD: 13.7 %
RED BLOOD CELLS URINE: 1 /HPF
REVIEW: NORMAL
RF+CCP IGG SER-IMP: NEGATIVE
RHEUMATOID FACT SER QL: <10 IU/ML
SODIUM SERPL-SCNC: 137 MMOL/L
SPECIFIC GRAVITY URINE: 1.01
UROBILINOGEN URINE: 0.2 MG/DL
WBC # FLD AUTO: 6.82 K/UL
WHITE BLOOD CELLS URINE: 1 /HPF

## 2025-04-02 DIAGNOSIS — R06.02 SHORTNESS OF BREATH: ICD-10-CM

## 2025-04-02 DIAGNOSIS — D86.9 SARCOIDOSIS, UNSPECIFIED: ICD-10-CM

## 2025-05-07 ENCOUNTER — TRANSCRIPTION ENCOUNTER (OUTPATIENT)
Age: 55
End: 2025-05-07

## 2025-05-20 ENCOUNTER — APPOINTMENT (OUTPATIENT)
Age: 55
End: 2025-05-20

## 2025-05-20 ENCOUNTER — APPOINTMENT (OUTPATIENT)
Age: 55
End: 2025-05-20
Payer: MEDICAID

## 2025-05-20 VITALS
DIASTOLIC BLOOD PRESSURE: 68 MMHG | RESPIRATION RATE: 14 BRPM | WEIGHT: 144 LBS | TEMPERATURE: 98 F | SYSTOLIC BLOOD PRESSURE: 126 MMHG | OXYGEN SATURATION: 99 % | HEART RATE: 77 BPM | HEIGHT: 66 IN | BODY MASS INDEX: 23.14 KG/M2

## 2025-05-20 DIAGNOSIS — R05.3 CHRONIC COUGH: ICD-10-CM

## 2025-05-20 DIAGNOSIS — D86.9 SARCOIDOSIS, UNSPECIFIED: ICD-10-CM

## 2025-05-20 DIAGNOSIS — R06.02 SHORTNESS OF BREATH: ICD-10-CM

## 2025-05-20 PROCEDURE — 94726 PLETHYSMOGRAPHY LUNG VOLUMES: CPT

## 2025-05-20 PROCEDURE — 94729 DIFFUSING CAPACITY: CPT

## 2025-05-20 PROCEDURE — 99213 OFFICE O/P EST LOW 20 MIN: CPT

## 2025-05-20 PROCEDURE — 94060 EVALUATION OF WHEEZING: CPT

## 2025-05-21 ENCOUNTER — APPOINTMENT (OUTPATIENT)
Age: 55
End: 2025-05-21

## 2025-05-21 ENCOUNTER — NON-APPOINTMENT (OUTPATIENT)
Age: 55
End: 2025-05-21

## 2025-05-21 VITALS
RESPIRATION RATE: 14 BRPM | OXYGEN SATURATION: 99 % | SYSTOLIC BLOOD PRESSURE: 133 MMHG | TEMPERATURE: 97.6 F | WEIGHT: 144 LBS | HEART RATE: 75 BPM | BODY MASS INDEX: 23.14 KG/M2 | HEIGHT: 66 IN | DIASTOLIC BLOOD PRESSURE: 85 MMHG

## 2025-05-21 PROCEDURE — 93000 ELECTROCARDIOGRAM COMPLETE: CPT | Mod: 1L

## 2025-05-21 PROCEDURE — 99214 OFFICE O/P EST MOD 30 MIN: CPT | Mod: 25

## 2025-05-21 RX ORDER — ROSUVASTATIN CALCIUM 5 MG/1
5 TABLET, FILM COATED ORAL
Qty: 90 | Refills: 3 | Status: ACTIVE | COMMUNITY
Start: 2025-05-21 | End: 1900-01-01

## 2025-07-23 ENCOUNTER — EMERGENCY (EMERGENCY)
Age: 55
LOS: 1 days | End: 2025-07-23
Attending: EMERGENCY MEDICINE | Admitting: EMERGENCY MEDICINE
Payer: MEDICAID

## 2025-07-23 VITALS
SYSTOLIC BLOOD PRESSURE: 153 MMHG | TEMPERATURE: 98 F | HEART RATE: 75 BPM | RESPIRATION RATE: 18 BRPM | DIASTOLIC BLOOD PRESSURE: 91 MMHG | OXYGEN SATURATION: 97 %

## 2025-07-23 VITALS
TEMPERATURE: 98 F | DIASTOLIC BLOOD PRESSURE: 97 MMHG | WEIGHT: 139.99 LBS | HEART RATE: 76 BPM | SYSTOLIC BLOOD PRESSURE: 147 MMHG | RESPIRATION RATE: 18 BRPM | OXYGEN SATURATION: 98 %

## 2025-07-23 DIAGNOSIS — S82.892A OTHER FRACTURE OF LEFT LOWER LEG, INITIAL ENCOUNTER FOR CLOSED FRACTURE: Chronic | ICD-10-CM

## 2025-07-23 LAB
ALBUMIN SERPL ELPH-MCNC: 4.7 G/DL — SIGNIFICANT CHANGE UP (ref 3.4–5)
ALP SERPL-CCNC: 102 U/L — SIGNIFICANT CHANGE UP (ref 40–120)
ALT FLD-CCNC: 23 U/L — SIGNIFICANT CHANGE UP (ref 12–42)
ANION GAP SERPL CALC-SCNC: 9 MMOL/L — SIGNIFICANT CHANGE UP (ref 9–16)
AST SERPL-CCNC: 16 U/L — SIGNIFICANT CHANGE UP (ref 15–37)
BASOPHILS # BLD AUTO: 0.04 K/UL — SIGNIFICANT CHANGE UP (ref 0–0.2)
BASOPHILS NFR BLD AUTO: 0.4 % — SIGNIFICANT CHANGE UP (ref 0–2)
BILIRUB SERPL-MCNC: 0.4 MG/DL — SIGNIFICANT CHANGE UP (ref 0.2–1.2)
BUN SERPL-MCNC: 15 MG/DL — SIGNIFICANT CHANGE UP (ref 7–23)
CALCIUM SERPL-MCNC: 9.8 MG/DL — SIGNIFICANT CHANGE UP (ref 8.5–10.5)
CHLORIDE SERPL-SCNC: 104 MMOL/L — SIGNIFICANT CHANGE UP (ref 96–108)
CO2 SERPL-SCNC: 30 MMOL/L — SIGNIFICANT CHANGE UP (ref 22–31)
CREAT SERPL-MCNC: 0.8 MG/DL — SIGNIFICANT CHANGE UP (ref 0.5–1.3)
EGFR: 88 ML/MIN/1.73M2 — SIGNIFICANT CHANGE UP
EGFR: 88 ML/MIN/1.73M2 — SIGNIFICANT CHANGE UP
EOSINOPHIL # BLD AUTO: 0.16 K/UL — SIGNIFICANT CHANGE UP (ref 0–0.5)
EOSINOPHIL NFR BLD AUTO: 1.7 % — SIGNIFICANT CHANGE UP (ref 0–6)
GLUCOSE SERPL-MCNC: 102 MG/DL — HIGH (ref 70–99)
HCT VFR BLD CALC: 39.1 % — SIGNIFICANT CHANGE UP (ref 34.5–45)
HGB BLD-MCNC: 12.9 G/DL — SIGNIFICANT CHANGE UP (ref 11.5–15.5)
IMM GRANULOCYTES # BLD AUTO: 0.03 K/UL — SIGNIFICANT CHANGE UP (ref 0–0.07)
IMM GRANULOCYTES NFR BLD AUTO: 0.3 % — SIGNIFICANT CHANGE UP (ref 0–0.9)
LYMPHOCYTES # BLD AUTO: 2.85 K/UL — SIGNIFICANT CHANGE UP (ref 1–3.3)
LYMPHOCYTES NFR BLD AUTO: 31.1 % — SIGNIFICANT CHANGE UP (ref 13–44)
MCHC RBC-ENTMCNC: 29.5 PG — SIGNIFICANT CHANGE UP (ref 27–34)
MCHC RBC-ENTMCNC: 33 G/DL — SIGNIFICANT CHANGE UP (ref 32–36)
MCV RBC AUTO: 89.5 FL — SIGNIFICANT CHANGE UP (ref 80–100)
MONOCYTES # BLD AUTO: 0.34 K/UL — SIGNIFICANT CHANGE UP (ref 0–0.9)
MONOCYTES NFR BLD AUTO: 3.7 % — SIGNIFICANT CHANGE UP (ref 2–14)
NEUTROPHILS # BLD AUTO: 5.74 K/UL — SIGNIFICANT CHANGE UP (ref 1.8–7.4)
NEUTROPHILS NFR BLD AUTO: 62.8 % — SIGNIFICANT CHANGE UP (ref 43–77)
NRBC # BLD AUTO: 0 K/UL — SIGNIFICANT CHANGE UP (ref 0–0)
NRBC # FLD: 0 K/UL — SIGNIFICANT CHANGE UP (ref 0–0)
NRBC BLD AUTO-RTO: 0 /100 WBCS — SIGNIFICANT CHANGE UP (ref 0–0)
NT-PROBNP SERPL-SCNC: 33 PG/ML — SIGNIFICANT CHANGE UP
PLATELET # BLD AUTO: 332 K/UL — SIGNIFICANT CHANGE UP (ref 150–400)
PMV BLD: 10.1 FL — SIGNIFICANT CHANGE UP (ref 7–13)
POTASSIUM SERPL-MCNC: 4.1 MMOL/L — SIGNIFICANT CHANGE UP (ref 3.5–5.3)
POTASSIUM SERPL-SCNC: 4.1 MMOL/L — SIGNIFICANT CHANGE UP (ref 3.5–5.3)
PROT SERPL-MCNC: 8.1 G/DL — SIGNIFICANT CHANGE UP (ref 6.4–8.2)
RBC # BLD: 4.37 M/UL — SIGNIFICANT CHANGE UP (ref 3.8–5.2)
RBC # FLD: 13.5 % — SIGNIFICANT CHANGE UP (ref 10.3–14.5)
SODIUM SERPL-SCNC: 143 MMOL/L — SIGNIFICANT CHANGE UP (ref 132–145)
TROPONIN I, HIGH SENSITIVITY RESULT: 11.6 NG/L — SIGNIFICANT CHANGE UP
WBC # BLD: 9.16 K/UL — SIGNIFICANT CHANGE UP (ref 3.8–10.5)
WBC # FLD AUTO: 9.16 K/UL — SIGNIFICANT CHANGE UP (ref 3.8–10.5)

## 2025-07-23 PROCEDURE — 99285 EMERGENCY DEPT VISIT HI MDM: CPT

## 2025-07-23 RX ORDER — DIAZEPAM 5 MG/1
5 TABLET ORAL ONCE
Refills: 0 | Status: DISCONTINUED | OUTPATIENT
Start: 2025-07-23 | End: 2025-07-23

## 2025-07-25 DIAGNOSIS — R07.89 OTHER CHEST PAIN: ICD-10-CM

## 2025-07-25 DIAGNOSIS — R42 DIZZINESS AND GIDDINESS: ICD-10-CM

## 2025-08-25 ENCOUNTER — APPOINTMENT (OUTPATIENT)
Age: 55
End: 2025-08-25